# Patient Record
Sex: MALE | Race: ASIAN | NOT HISPANIC OR LATINO | Employment: OTHER | URBAN - METROPOLITAN AREA
[De-identification: names, ages, dates, MRNs, and addresses within clinical notes are randomized per-mention and may not be internally consistent; named-entity substitution may affect disease eponyms.]

---

## 2017-01-13 ENCOUNTER — APPOINTMENT (OUTPATIENT)
Dept: ULTRASOUND IMAGING | Facility: CLINIC | Age: 37
End: 2017-01-13
Attending: FAMILY MEDICINE
Payer: COMMERCIAL

## 2017-01-13 ENCOUNTER — HOSPITAL ENCOUNTER (EMERGENCY)
Facility: CLINIC | Age: 37
Discharge: HOME | End: 2017-01-13
Attending: FAMILY MEDICINE | Admitting: FAMILY MEDICINE
Payer: COMMERCIAL

## 2017-01-13 VITALS
HEART RATE: 88 BPM | TEMPERATURE: 98.2 F | RESPIRATION RATE: 20 BRPM | OXYGEN SATURATION: 100 % | DIASTOLIC BLOOD PRESSURE: 84 MMHG | SYSTOLIC BLOOD PRESSURE: 132 MMHG

## 2017-01-13 DIAGNOSIS — R10.11 RUQ ABDOMINAL PAIN: ICD-10-CM

## 2017-01-13 LAB
ALBUMIN SERPL-MCNC: 4.1 G/DL (ref 3.4–5)
ALBUMIN UR-MCNC: NEGATIVE MG/DL
ALP SERPL-CCNC: 80 U/L (ref 40–150)
ALT SERPL W P-5'-P-CCNC: 21 U/L (ref 0–70)
ANION GAP SERPL CALCULATED.3IONS-SCNC: 7 MMOL/L (ref 3–14)
APPEARANCE UR: CLEAR
AST SERPL W P-5'-P-CCNC: 15 U/L (ref 0–45)
BASOPHILS # BLD AUTO: 0.1 10E9/L (ref 0–0.2)
BASOPHILS NFR BLD AUTO: 0.5 %
BILIRUB SERPL-MCNC: 0.2 MG/DL (ref 0.2–1.3)
BILIRUB UR QL STRIP: NEGATIVE
BUN SERPL-MCNC: 15 MG/DL (ref 7–30)
CALCIUM SERPL-MCNC: 8.8 MG/DL (ref 8.5–10.1)
CHLORIDE SERPL-SCNC: 107 MMOL/L (ref 94–109)
CO2 SERPL-SCNC: 28 MMOL/L (ref 20–32)
COLOR UR AUTO: NORMAL
CREAT SERPL-MCNC: 0.96 MG/DL (ref 0.66–1.25)
DIFFERENTIAL METHOD BLD: NORMAL
EOSINOPHIL # BLD AUTO: 0.3 10E9/L (ref 0–0.7)
EOSINOPHIL NFR BLD AUTO: 3.3 %
ERYTHROCYTE [DISTWIDTH] IN BLOOD BY AUTOMATED COUNT: 13.6 % (ref 10–15)
GFR SERPL CREATININE-BSD FRML MDRD: 90 ML/MIN/1.7M2
GLUCOSE SERPL-MCNC: 90 MG/DL (ref 70–99)
GLUCOSE UR STRIP-MCNC: NEGATIVE MG/DL
HBA1C MFR BLD: 6.3 % (ref 4.3–6)
HCT VFR BLD AUTO: 45 % (ref 40–53)
HGB BLD-MCNC: 14.9 G/DL (ref 13.3–17.7)
HGB UR QL STRIP: NEGATIVE
IMM GRANULOCYTES # BLD: 0 10E9/L (ref 0–0.4)
IMM GRANULOCYTES NFR BLD: 0.3 %
KETONES UR STRIP-MCNC: NEGATIVE MG/DL
LEUKOCYTE ESTERASE UR QL STRIP: NEGATIVE
LIPASE SERPL-CCNC: 322 U/L (ref 73–393)
LYMPHOCYTES # BLD AUTO: 2.9 10E9/L (ref 0.8–5.3)
LYMPHOCYTES NFR BLD AUTO: 28.4 %
MCH RBC QN AUTO: 28.8 PG (ref 26.5–33)
MCHC RBC AUTO-ENTMCNC: 33.1 G/DL (ref 31.5–36.5)
MCV RBC AUTO: 87 FL (ref 78–100)
MONOCYTES # BLD AUTO: 0.9 10E9/L (ref 0–1.3)
MONOCYTES NFR BLD AUTO: 8.4 %
NEUTROPHILS # BLD AUTO: 6 10E9/L (ref 1.6–8.3)
NEUTROPHILS NFR BLD AUTO: 59.1 %
NITRATE UR QL: NEGATIVE
PH UR STRIP: 6 PH (ref 5–7)
PLATELET # BLD AUTO: 228 10E9/L (ref 150–450)
POTASSIUM SERPL-SCNC: 3.7 MMOL/L (ref 3.4–5.3)
PROT SERPL-MCNC: 7.4 G/DL (ref 6.8–8.8)
RBC # BLD AUTO: 5.18 10E12/L (ref 4.4–5.9)
RBC #/AREA URNS AUTO: <1 /HPF (ref 0–2)
SODIUM SERPL-SCNC: 142 MMOL/L (ref 133–144)
SP GR UR STRIP: 1.01 (ref 1–1.03)
TROPONIN I SERPL-MCNC: NORMAL UG/L (ref 0–0.04)
URN SPEC COLLECT METH UR: NORMAL
UROBILINOGEN UR STRIP-MCNC: NORMAL MG/DL (ref 0–2)
WBC # BLD AUTO: 10.1 10E9/L (ref 4–11)
WBC #/AREA URNS AUTO: 1 /HPF (ref 0–2)

## 2017-01-13 PROCEDURE — 81001 URINALYSIS AUTO W/SCOPE: CPT | Performed by: EMERGENCY MEDICINE

## 2017-01-13 PROCEDURE — 25000125 ZZHC RX 250: Performed by: FAMILY MEDICINE

## 2017-01-13 PROCEDURE — 96374 THER/PROPH/DIAG INJ IV PUSH: CPT

## 2017-01-13 PROCEDURE — 85025 COMPLETE CBC W/AUTO DIFF WBC: CPT | Performed by: FAMILY MEDICINE

## 2017-01-13 PROCEDURE — 84484 ASSAY OF TROPONIN QUANT: CPT | Performed by: FAMILY MEDICINE

## 2017-01-13 PROCEDURE — 76705 ECHO EXAM OF ABDOMEN: CPT

## 2017-01-13 PROCEDURE — 80053 COMPREHEN METABOLIC PANEL: CPT | Performed by: FAMILY MEDICINE

## 2017-01-13 PROCEDURE — 83036 HEMOGLOBIN GLYCOSYLATED A1C: CPT | Performed by: FAMILY MEDICINE

## 2017-01-13 PROCEDURE — 93010 ELECTROCARDIOGRAM REPORT: CPT | Performed by: FAMILY MEDICINE

## 2017-01-13 PROCEDURE — 99285 EMERGENCY DEPT VISIT HI MDM: CPT | Mod: 25

## 2017-01-13 PROCEDURE — 93005 ELECTROCARDIOGRAM TRACING: CPT

## 2017-01-13 PROCEDURE — 99284 EMERGENCY DEPT VISIT MOD MDM: CPT | Mod: 25 | Performed by: FAMILY MEDICINE

## 2017-01-13 PROCEDURE — 83690 ASSAY OF LIPASE: CPT | Performed by: FAMILY MEDICINE

## 2017-01-13 RX ORDER — KETOROLAC TROMETHAMINE 30 MG/ML
30 INJECTION, SOLUTION INTRAMUSCULAR; INTRAVENOUS ONCE
Status: COMPLETED | OUTPATIENT
Start: 2017-01-13 | End: 2017-01-13

## 2017-01-13 RX ADMIN — KETOROLAC TROMETHAMINE 30 MG: 30 INJECTION, SOLUTION INTRAMUSCULAR at 19:04

## 2017-01-13 ASSESSMENT — ENCOUNTER SYMPTOMS
CONSTITUTIONAL NEGATIVE: 1
CARDIOVASCULAR NEGATIVE: 1
MUSCULOSKELETAL NEGATIVE: 1
PSYCHIATRIC NEGATIVE: 1
ENDOCRINE NEGATIVE: 1
ALLERGIC/IMMUNOLOGIC NEGATIVE: 1
ABDOMINAL PAIN: 1
NEUROLOGICAL NEGATIVE: 1
HEMATOLOGIC/LYMPHATIC NEGATIVE: 1
EYES NEGATIVE: 1
RESPIRATORY NEGATIVE: 1

## 2017-01-13 NOTE — ED AVS SNAPSHOT
[facility]    [address] [address]    Phone:  [phone]    Fax:  [phone]                                       Zhao De Leon   MRN: [id number]    Department:  [facility]   Date of Visit:  1/13/2017           Patient Information     Date Of Birth          1980        Your diagnoses for this visit were:     RUQ abdominal pain Etiology unclear       You were seen by Stuart Quiñonez MD.      Follow-up Information     Schedule an appointment as soon as possible for a visit with Elvie Cheng MD.    Specialty:  Surgery    Contact information:    [facility]  [address] [address]  [phone]          Discharge Instructions         Abdominal Pain  Abdominal pain is pain in the stomach or intestinal area. Everyone has this pain from time to time. In many cases it goes away on its own. But abdominal pain can sometimes be due to a serious problem, such as appendicitis. So it s important to know when to seek help.  Causes of abdominal pain  There are many possible causes of abdominal pain. Common causes in adults include:    Constipation, diarrhea, or gas    GERD (gastroesophageal reflux disease) movement of stomach acid into the esophagus, also known as acid reflux or heartburn    Peptic ulcer (a sore in the lining of the stomach or small intestine)    Inflammation of the gallbladder, liver, or pancreas    Gallstones or kidney stones    Appendicitis     Obstruction of the intestines     Hernia (bulging of an internal organ through a muscle or other tissue)    Urinary tract infections    In women, menstrual cramps, fibroids, or endometriosis of the uterus    Inflammation or infection of the intestines  Diagnosing the cause of abdominal pain  Your health care provider will examine you to help find the cause of your pain. If needed, tests will be ordered. Because abdominal pain  has so many possible causes, it can be hard to discover the reason for the pain. Giving details about your pain can help. Be ready to tell your health care provider where and when you feel the pain and what makes it better or worse. Also mention whether you have other symptoms such as fever, tiredness, nausea, vomiting, or changes in bathroom habits.  Treating abdominal pain  Certain causes of pain, such as appendicitis or a bowel obstruction, need emergency treatment. Other problems can be treated with rest, fluids, or medications. Your health care provider can give you specific instructions for treatment or self-care based on the cause of your pain.  If you have vomiting or diarrhea, sip water or other clear fluids. When you are ready to eat solid foods again, start with small amounts of easy-to-digest, low-fat foods, such as applesauce, toast, or crackers.   When to call the doctor  Call 911 or go to the hospital right away if you:    Can t pass stool and are vomiting    Are vomiting blood or have black, tarry diarrhea    Also have chest, neck, or shoulder pain    Feel like you are about to pass out    Have pain in your shoulder blades with nausea    Have sudden, excruciating abdominal pain    Have new, severe pain unlike any you have felt before    Have a belly that is rigid, hard, and tender to touch  Call your doctor if you have:    Pain for more than 5 days    Bloating for more than 2 days    Diarrhea for more than 5 days    Fever of 101 F (38.3 C) or higher    Pain that continues to worsen    Unexplained weight loss    Continued lack of appetite    Blood in the stool  How to prevent abdominal pain  Here are some tips to help prevent abdominal pain:    Eat smaller amounts of food at one time.    Avoid greasy, fried, or other high-fat foods.    Avoid foods that give you gas.    Exercise regularly.    Drink plenty of fluids.  To help prevent symptoms of gastroesophageal reflux disease (GERD):    Quit  smoking.    Reduce alcohol and certain foods that increase stomach acid.     Lose excess weight.    Finish eating at least 2 hours before you go to bed or lie down.    Elevate the head of your bed.    9541-0629 [employer]. [address] [place] [address], [place] [address]. All rights reserved. This information is not intended as a substitute for professional medical care. Always follow your healthcare professional's instructions.          24 Hour Appointment Hotline       To make an appointment at any [facility], call [phone] ([phone]). If you don't have a family doctor or clinic, we will help you find one. [facility] are conveniently located to serve the needs of you and your family.             Review of your medicines      Our records show that you are taking the medicines listed below. If these are incorrect, please call your family doctor or clinic.        Dose / Directions Last dose taken    ALEVE PO   Dose:  440 mg        Take 440 mg by mouth 2 times daily as needed for moderate pain   Refills:  0        ibuprofen 800 MG tablet   Commonly known as:  ADVIL/MOTRIN   Dose:  800 mg   Quantity:  30 tablet        Take 1 tablet (800 mg) by mouth every 6 hours as needed for moderate pain   Refills:  0                Procedures and tests performed during your visit     CBC with platelets differential    Comprehensive metabolic panel    EKG 12-lead, tracing only    Hemaglobin A1C    Lipase    Troponin I    UA with Microscopic reflex to Culture    US Abdomen Limited      Orders Needing Specimen Collection     Ordered          01/13/17 1852  UA reflex to Microscopic and Culture - STAT, [facility]: STAT, Needs to be Collected     Scheduled Task Status   01/13/17 1851 Collect UA reflex to Microscopic and Culture Open   Order Class:  PCU Collect                  Pending Results     Date and Time Order Name Status Description    1/13/2017 1852 US Abdomen Limited Preliminary             Pending  Culture Results     No orders found from 1/12/2017 to 1/14/2017.       Test Results from your hospital stay           1/13/2017  6:55 PM - Interface, Flexilab Results      Component Results     Component Value Ref Range & Units Status    Color Urine Light Yellow  Final    Appearance Urine Clear  Final    Glucose Urine Negative NEG mg/dL Final    Bilirubin Urine Negative NEG Final    Ketones Urine Negative NEG mg/dL Final    Specific Gravity Urine 1.015 1.003 - 1.035 Final    Blood Urine Negative NEG Final    pH Urine 6.0 5.0 - 7.0 pH Final    Protein Albumin Urine Negative NEG mg/dL Final    Urobilinogen mg/dL Normal 0.0 - 2.0 mg/dL Final    Nitrite Urine Negative NEG Final    Leukocyte Esterase Urine Negative NEG Final    Source Midstream Urine  Final    WBC Urine 1 0 - 2 /HPF Final    RBC Urine <1 0 - 2 /HPF Final         1/13/2017  7:29 PM - Interface, Flexilab Results      Component Results     Component Value Ref Range & Units Status    WBC 10.1 4.0 - 11.0 10e9/L Final    RBC Count 5.18 4.4 - 5.9 10e12/L Final    Hemoglobin 14.9 13.3 - 17.7 g/dL Final    Hematocrit 45.0 40.0 - 53.0 % Final    MCV 87 78 - 100 fl Final    MCH 28.8 26.5 - 33.0 pg Final    MCHC 33.1 31.5 - 36.5 g/dL Final    RDW 13.6 10.0 - 15.0 % Final    Platelet Count 228 150 - 450 10e9/L Final    Diff Method Automated Method  Final    % Neutrophils 59.1 % Final    % Lymphocytes 28.4 % Final    % Monocytes 8.4 % Final    % Eosinophils 3.3 % Final    % Basophils 0.5 % Final    % Immature Granulocytes 0.3 % Final    Absolute Neutrophil 6.0 1.6 - 8.3 10e9/L Final    Absolute Lymphocytes 2.9 0.8 - 5.3 10e9/L Final    Absolute Monocytes 0.9 0.0 - 1.3 10e9/L Final    Absolute Eosinophils 0.3 0.0 - 0.7 10e9/L Final    Absolute Basophils 0.1 0.0 - 0.2 10e9/L Final    Abs Immature Granulocytes 0.0 0 - 0.4 10e9/L Final         1/13/2017  7:48 PM - Interface, Flexilab Results      Component Results     Component Value Ref Range & Units Status    Sodium 142  133 - 144 mmol/L Final    Potassium 3.7 3.4 - 5.3 mmol/L Final    Chloride 107 94 - 109 mmol/L Final    Carbon Dioxide 28 20 - 32 mmol/L Final    Anion Gap 7 3 - 14 mmol/L Final    Glucose 90 70 - 99 mg/dL Final    Urea Nitrogen 15 7 - 30 mg/dL Final    Creatinine 0.96 0.66 - 1.25 mg/dL Final    GFR Estimate 90 >60 mL/min/1.7m2 Final    Non  GFR Calc    GFR Estimate If Black >90   GFR Calc   >60 mL/min/1.7m2 Final    Calcium 8.8 8.5 - 10.1 mg/dL Final    Bilirubin Total 0.2 0.2 - 1.3 mg/dL Final    Albumin 4.1 3.4 - 5.0 g/dL Final    Protein Total 7.4 6.8 - 8.8 g/dL Final    Alkaline Phosphatase 80 40 - 150 U/L Final    ALT 21 0 - 70 U/L Final    AST 15 0 - 45 U/L Final         1/13/2017  7:44 PM - Interface, Flexilab Results      Component Results     Component Value Ref Range & Units Status    Lipase 322 73 - 393 U/L Final         1/13/2017  7:44 PM - Interface, Flexilab Results      Component Results     Component Value Ref Range & Units Status    Troponin I ES  0.000 - 0.045 ug/L Final    <0.015  The 99th percentile for upper reference range is 0.045 ug/L.  Troponin values in   the range of 0.045 - 0.120 ug/L may be associated with risks of adverse   clinical events.           1/13/2017  8:07 PM - Interface, Radiant Ib      Narrative     RIGHT UPPER QUADRANT ULTRASOUND 1/13/2017 7:44 PM    HISTORY:  Right upper quadrant abdominal pain.    COMPARISON: None.    FINDINGS:    Gallbladder: Gallbladder is contracted with multiple small polypoid  structures within it that may indicate adenomyosis. No stones are  seen. No focal tenderness.    Bile ducts:   CHD is normal diameter.  No intrahepatic biliary  dilatation.    Liver:   Normal.     Pancreas:  Neck and proximal body appear normal. Head and tail are  obscured by gas.    Right kidney:   Normal.         Impression     IMPRESSION:    1. Contracted gallbladder with probable adenomyosis.  2. Pancreas mostly obscured by gas.             1/13/2017  8:04 PM - Interface, [employer] Results      Component Results     Component Value Ref Range & Units Status    Hemoglobin A1C 6.3 (H) 4.3 - 6.0 % Final                Thank you for choosing [facility]       Thank you for choosing [facility] for your care. Our goal is always to provide you with excellent care. Hearing back from our patients is one way we can continue to improve our services. Please take a few minutes to complete the written survey that you may receive in the mail after you visit with us. Thank you!        [employer] Information     [employer] gives you secure access to your electronic health record. If you see a primary care provider, you can also send messages to your care team and make appointments. If you have questions, please call your primary care clinic.  If you do not have a primary care provider, please call [phone] and they will assist you.        Care EveryWhere ID     This is your Care EveryWhere ID. This could be used by other organizations to access your [facility] medical records  [id number]        After Visit Summary       This is your record. Keep this with you and show to your community pharmacist(s) and doctor(s) at your next visit.

## 2017-01-13 NOTE — ED AVS SNAPSHOT
[facility]    [address] [address]    Phone:  [phone]    Fax:  [phone]                                       Zhao De Leon   MRN: [id number]    Department:  [facility]   Date of Visit:  1/13/2017           After Visit Summary Signature Page     I have received my discharge instructions, and my questions have been answered. I have discussed any challenges I see with this plan with the nurse or doctor.    ..........................................................................................................................................  Patient/Patient Representative Signature      ..........................................................................................................................................  Patient Representative Print Name and Relationship to Patient    ..................................................               ................................................  Date                                            Time    ..........................................................................................................................................  Reviewed by Signature/Title    ...................................................              ..............................................  Date                                                            Time

## 2017-01-14 NOTE — DISCHARGE INSTRUCTIONS
Abdominal Pain  Abdominal pain is pain in the stomach or intestinal area. Everyone has this pain from time to time. In many cases it goes away on its own. But abdominal pain can sometimes be due to a serious problem, such as appendicitis. So it s important to know when to seek help.  Causes of abdominal pain  There are many possible causes of abdominal pain. Common causes in adults include:    Constipation, diarrhea, or gas    GERD (gastroesophageal reflux disease) movement of stomach acid into the esophagus, also known as acid reflux or heartburn    Peptic ulcer (a sore in the lining of the stomach or small intestine)    Inflammation of the gallbladder, liver, or pancreas    Gallstones or kidney stones    Appendicitis     Obstruction of the intestines     Hernia (bulging of an internal organ through a muscle or other tissue)    Urinary tract infections    In women, menstrual cramps, fibroids, or endometriosis of the uterus    Inflammation or infection of the intestines  Diagnosing the cause of abdominal pain  Your health care provider will examine you to help find the cause of your pain. If needed, tests will be ordered. Because abdominal pain has so many possible causes, it can be hard to discover the reason for the pain. Giving details about your pain can help. Be ready to tell your health care provider where and when you feel the pain and what makes it better or worse. Also mention whether you have other symptoms such as fever, tiredness, nausea, vomiting, or changes in bathroom habits.  Treating abdominal pain  Certain causes of pain, such as appendicitis or a bowel obstruction, need emergency treatment. Other problems can be treated with rest, fluids, or medications. Your health care provider can give you specific instructions for treatment or self-care based on the cause of your pain.  If you have vomiting or diarrhea, sip water or other clear fluids. When you are ready to eat solid foods again, start with  small amounts of easy-to-digest, low-fat foods, such as applesauce, toast, or crackers.   When to call the doctor  Call 911 or go to the hospital right away if you:    Can t pass stool and are vomiting    Are vomiting blood or have black, tarry diarrhea    Also have chest, neck, or shoulder pain    Feel like you are about to pass out    Have pain in your shoulder blades with nausea    Have sudden, excruciating abdominal pain    Have new, severe pain unlike any you have felt before    Have a belly that is rigid, hard, and tender to touch  Call your doctor if you have:    Pain for more than 5 days    Bloating for more than 2 days    Diarrhea for more than 5 days    Fever of 101 F (38.3 C) or higher    Pain that continues to worsen    Unexplained weight loss    Continued lack of appetite    Blood in the stool  How to prevent abdominal pain  Here are some tips to help prevent abdominal pain:    Eat smaller amounts of food at one time.    Avoid greasy, fried, or other high-fat foods.    Avoid foods that give you gas.    Exercise regularly.    Drink plenty of fluids.  To help prevent symptoms of gastroesophageal reflux disease (GERD):    Quit smoking.    Reduce alcohol and certain foods that increase stomach acid.     Lose excess weight.    Finish eating at least 2 hours before you go to bed or lie down.    Elevate the head of your bed.    7697-2565 [employer]. [address] [place] [address], [place] [address]. All rights reserved. This information is not intended as a substitute for professional medical care. Always follow your healthcare professional's instructions.

## 2017-01-16 ENCOUNTER — OFFICE VISIT (OUTPATIENT)
Dept: SURGERY | Facility: CLINIC | Age: 37
End: 2017-01-16
Payer: COMMERCIAL

## 2017-01-16 VITALS
DIASTOLIC BLOOD PRESSURE: 77 MMHG | HEIGHT: 71 IN | HEART RATE: 102 BPM | SYSTOLIC BLOOD PRESSURE: 135 MMHG | BODY MASS INDEX: 26.04 KG/M2 | TEMPERATURE: 98.3 F | WEIGHT: 186 LBS

## 2017-01-16 DIAGNOSIS — R10.11 RIGHT UPPER QUADRANT PAIN: Primary | ICD-10-CM

## 2017-01-16 DIAGNOSIS — R10.11 RIGHT UPPER QUADRANT ABDOMINAL PAIN: ICD-10-CM

## 2017-01-16 PROCEDURE — 99204 OFFICE O/P NEW MOD 45 MIN: CPT | Performed by: SURGERY

## 2017-01-16 ASSESSMENT — PAIN SCALES - GENERAL: PAINLEVEL: MILD PAIN (3)

## 2017-01-16 NOTE — PROGRESS NOTES
PCP:  No primary care provider on file.    Chief complaint: Right upper quadrant pain, abnormal ultrasound    History of Present Illness: This 36-year-old healthy man presents to the surgical clinic in follow-up from an ER visit last Friday (3 days ago). He presented to the emergency room with complaints of right-sided abdominal pain which had begun about 3-4 days prior. He has some anxiety issues which she readily admits. This caused him to be concerned about the pain. He was wondering if he had gallstones or maybe even appendicitis. The pain since Friday has improved, but is still present.    In the emergency room he had a fairly thorough evaluation. He had labs which were all negative. He had an ultrasound of his gallbladder which showed a contracted gallbladder. He had not been fasting for this procedure. He also had the finding of a lot of polypoid lesions lining the mucosal side of the gallbladder wall. These were not seen on a CT scan performed in 2010, which was done for kidney stones. No other symptoms to speak of no nausea vomiting fevers change in bowel habits etc.    He is not interested in any surgical treatment, but was primarily concerned about what these polyps meant to his future health.    He has a history of peptic ulcer disease diagnosed about 10 years ago. This pain is different than that.    Histories:  Past Medical History   Diagnosis Date     Peptic ulcer, unspecified site, unspecified as acute or chronic, without mention of hemorrhage, perforation, or obstruction      Lumbago      Lumbar disc disease      with herniation     Ulcer (H) 2000     History of duodenal ulcer       Past Surgical History   Procedure Laterality Date     Surgical history of -        Lumbar Disc Herniation     Colonoscopy  3/6/2013     Procedure: COLONOSCOPY;  Colonoscopy  ;  Surgeon: Aashish Sierra MD;  Location: [facility]       Family History   Problem Relation Age of Onset     GASTROINTESTINAL DISEASE Father       hepatitis B     Blood Disease Father      hepatitis B     Cancer - colorectal Maternal Grandmother      Cancer - colorectal Paternal Grandmother      Coronary Artery Disease Maternal Grandfather 72     stroke       Social History   Substance Use Topics     Smoking status: Current Every Day Smoker -- 0.50 packs/day     Types: Other     Smokeless tobacco: Never Used      Comment:  1/2 to 1 ppd- he is using the vapor.      Alcohol Use: Yes      Comment: occ- very rare, once per year.       Current Outpatient Prescriptions   Medication Sig Dispense Refill     Naproxen Sodium (ALEVE PO) Take 440 mg by mouth 2 times daily as needed for moderate pain       ibuprofen (ADVIL,MOTRIN) 800 MG tablet Take 1 tablet (800 mg) by mouth every 6 hours as needed for moderate pain 30 tablet 0       Allergies   Allergen Reactions     Demerol Hives       Images:  Recent Results (from the past 744 hour(s))   US Abdomen Limited    Narrative    RIGHT UPPER QUADRANT ULTRASOUND 1/13/2017 7:44 PM    HISTORY:  Right upper quadrant abdominal pain.    COMPARISON: None.    FINDINGS:    Gallbladder: Gallbladder is contracted with multiple small polypoid  structures within it that may indicate adenomyosis. No stones are  seen. No focal tenderness.    Bile ducts:   CHD is normal diameter.  No intrahepatic biliary  dilatation.    Liver:   Normal.     Pancreas:  Neck and proximal body appear normal. Head and tail are  obscured by gas.    Right kidney:   Normal.       Impression    IMPRESSION:    1. Contracted gallbladder with probable adenomyosis.  2. Pancreas mostly obscured by gas.     LAURYN BROTHERS MD       Labs:  Results for orders placed or performed during the hospital encounter of 01/13/17   US Abdomen Limited    Narrative    RIGHT UPPER QUADRANT ULTRASOUND 1/13/2017 7:44 PM    HISTORY:  Right upper quadrant abdominal pain.    COMPARISON: None.    FINDINGS:    Gallbladder: Gallbladder is contracted with multiple small polypoid  structures  within it that may indicate adenomyosis. No stones are  seen. No focal tenderness.    Bile ducts:   CHD is normal diameter.  No intrahepatic biliary  dilatation.    Liver:   Normal.     Pancreas:  Neck and proximal body appear normal. Head and tail are  obscured by gas.    Right kidney:   Normal.       Impression    IMPRESSION:    1. Contracted gallbladder with probable adenomyosis.  2. Pancreas mostly obscured by gas.     LAURYN BROTHERS MD   UA with Microscopic reflex to Culture   Result Value Ref Range    Color Urine Light Yellow     Appearance Urine Clear     Glucose Urine Negative NEG mg/dL    Bilirubin Urine Negative NEG    Ketones Urine Negative NEG mg/dL    Specific Gravity Urine 1.015 1.003 - 1.035    Blood Urine Negative NEG    pH Urine 6.0 5.0 - 7.0 pH    Protein Albumin Urine Negative NEG mg/dL    Urobilinogen mg/dL Normal 0.0 - 2.0 mg/dL    Nitrite Urine Negative NEG    Leukocyte Esterase Urine Negative NEG    Source Midstream Urine     WBC Urine 1 0 - 2 /HPF    RBC Urine <1 0 - 2 /HPF   CBC with platelets differential   Result Value Ref Range    WBC 10.1 4.0 - 11.0 10e9/L    RBC Count 5.18 4.4 - 5.9 10e12/L    Hemoglobin 14.9 13.3 - 17.7 g/dL    Hematocrit 45.0 40.0 - 53.0 %    MCV 87 78 - 100 fl    MCH 28.8 26.5 - 33.0 pg    MCHC 33.1 31.5 - 36.5 g/dL    RDW 13.6 10.0 - 15.0 %    Platelet Count 228 150 - 450 10e9/L    Diff Method Automated Method     % Neutrophils 59.1 %    % Lymphocytes 28.4 %    % Monocytes 8.4 %    % Eosinophils 3.3 %    % Basophils 0.5 %    % Immature Granulocytes 0.3 %    Absolute Neutrophil 6.0 1.6 - 8.3 10e9/L    Absolute Lymphocytes 2.9 0.8 - 5.3 10e9/L    Absolute Monocytes 0.9 0.0 - 1.3 10e9/L    Absolute Eosinophils 0.3 0.0 - 0.7 10e9/L    Absolute Basophils 0.1 0.0 - 0.2 10e9/L    Abs Immature Granulocytes 0.0 0 - 0.4 10e9/L   Comprehensive metabolic panel   Result Value Ref Range    Sodium 142 133 - 144 mmol/L    Potassium 3.7 3.4 - 5.3 mmol/L    Chloride 107 94 - 109  "mmol/L    Carbon Dioxide 28 20 - 32 mmol/L    Anion Gap 7 3 - 14 mmol/L    Glucose 90 70 - 99 mg/dL    Urea Nitrogen 15 7 - 30 mg/dL    Creatinine 0.96 0.66 - 1.25 mg/dL    GFR Estimate 90 >60 mL/min/1.7m2    GFR Estimate If Black >90   GFR Calc   >60 mL/min/1.7m2    Calcium 8.8 8.5 - 10.1 mg/dL    Bilirubin Total 0.2 0.2 - 1.3 mg/dL    Albumin 4.1 3.4 - 5.0 g/dL    Protein Total 7.4 6.8 - 8.8 g/dL    Alkaline Phosphatase 80 40 - 150 U/L    ALT 21 0 - 70 U/L    AST 15 0 - 45 U/L   Lipase   Result Value Ref Range    Lipase 322 73 - 393 U/L   Troponin I   Result Value Ref Range    Troponin I ES  0.000 - 0.045 ug/L     <0.015  The 99th percentile for upper reference range is 0.045 ug/L.  Troponin values in   the range of 0.045 - 0.120 ug/L may be associated with risks of adverse   clinical events.     Hemaglobin A1C   Result Value Ref Range    Hemoglobin A1C 6.3 (H) 4.3 - 6.0 %       ROS:  Constitutional - Denies fevers, weight loss, malaise, lethargy  Neuro - Denies tremors or seizures  Pulmon - Denies SOB, dyspnea, hemoptysis, chronic cough or use of an inhaler  CV - Denies CP, SOB, lower extremity edema, difficulty w/ stairs, has never used NTG  GI - Denies hematemesis, BRBPR, melena, chronic diarrhea   - Denies hematuria, difficulty voiding, h/o STDs  Hematology - Denies blood clotting disorders, chronic anemias  Dermatology - No melanomas or skin cancers  Rheumatology - No h/o RA  Pysch - Denies depression, bipolar d/o or schizophrenia    /77 mmHg  Pulse 102  Temp(Src) 98.3  F (36.8  C) (Oral)  Ht 1.791 m (5' 10.5\")  Wt 84.369 kg (186 lb)  BMI 26.30 kg/m2    Exam:  General - Alert and Oriented X4, NAD, well nourished  HEENT - Normocephalic, atraumatic, PERRLA, Nose midline,   Neck - supple, no LAD,   Lungs -respirations unlabored  CV - Heart RRR,  Abdomen - Soft, non-tender, +BS, no hepatosplenomegaly, no palpable masses  Rectal -deferred  Neuro - Full ROM, Strength 5/5 and major " muscle groups, sensation intact  Extremities - No cyanosis, clubbing or edema.      Assessment and Plan: Abdominal pain which is resolving. I really don't have a reason for his pain. Regarding his abnormal ultrasound, it sounds like he has adenomyosis. I reviewed this diagnosis with him. It is non-malignant polyposis of the gallbladder. It does not put him at risk for cancer. Polyps of the gallbladder are only dealt with if they are greater than 1 cm in diameter. None of his approach that dimension.    I don't think he requires any surgical treatment, and as mentioned he is not interested in any. He would like to have this followed, and I think is reasonable. Therefore I recommended an ultrasound and 70 months. He will come to this exam fasting, which provided better view of the gallbladder wall. I'd be happy to see him after that to go over the results he is happy with that plan. We'll proceed as outlined above        Mando Bravo MD

## 2017-01-16 NOTE — NURSING NOTE
"Initial /77 mmHg  Pulse 102  Temp(Src) 98.3  F (36.8  C) (Oral)  Ht 1.791 m (5' 10.5\")  Wt 84.369 kg (186 lb)  BMI 26.30 kg/m2 Estimated body mass index is 26.3 kg/(m^2) as calculated from the following:    Height as of this encounter: 1.791 m (5' 10.5\").    Weight as of this encounter: 84.369 kg (186 lb). .    Clair Crawford CMA    "

## 2018-07-24 ENCOUNTER — APPOINTMENT (OUTPATIENT)
Dept: ULTRASOUND IMAGING | Facility: CLINIC | Age: 38
End: 2018-07-24
Attending: EMERGENCY MEDICINE
Payer: COMMERCIAL

## 2018-07-24 ENCOUNTER — HOSPITAL ENCOUNTER (EMERGENCY)
Facility: CLINIC | Age: 38
Discharge: HOME | End: 2018-07-24
Attending: EMERGENCY MEDICINE | Admitting: EMERGENCY MEDICINE
Payer: COMMERCIAL

## 2018-07-24 VITALS
SYSTOLIC BLOOD PRESSURE: 126 MMHG | RESPIRATION RATE: 18 BRPM | HEART RATE: 103 BPM | TEMPERATURE: 99 F | BODY MASS INDEX: 28.15 KG/M2 | WEIGHT: 199 LBS | DIASTOLIC BLOOD PRESSURE: 75 MMHG | OXYGEN SATURATION: 97 %

## 2018-07-24 DIAGNOSIS — R10.11 ABDOMINAL PAIN, RIGHT UPPER QUADRANT: ICD-10-CM

## 2018-07-24 DIAGNOSIS — K82.4 GALLBLADDER POLYP: ICD-10-CM

## 2018-07-24 LAB
ALBUMIN SERPL-MCNC: 4 G/DL (ref 3.4–5)
ALBUMIN UR-MCNC: NEGATIVE MG/DL
ALP SERPL-CCNC: 97 U/L (ref 40–150)
ALT SERPL W P-5'-P-CCNC: 27 U/L (ref 0–70)
ANION GAP SERPL CALCULATED.3IONS-SCNC: 5 MMOL/L (ref 3–14)
APPEARANCE UR: CLEAR
AST SERPL W P-5'-P-CCNC: 14 U/L (ref 0–45)
BASOPHILS # BLD AUTO: 0 10E9/L (ref 0–0.2)
BASOPHILS NFR BLD AUTO: 0.5 %
BILIRUB SERPL-MCNC: 0.4 MG/DL (ref 0.2–1.3)
BILIRUB UR QL STRIP: NEGATIVE
BUN SERPL-MCNC: 15 MG/DL (ref 7–30)
CALCIUM SERPL-MCNC: 8.7 MG/DL (ref 8.5–10.1)
CHLORIDE SERPL-SCNC: 105 MMOL/L (ref 94–109)
CO2 SERPL-SCNC: 28 MMOL/L (ref 20–32)
COLOR UR AUTO: ABNORMAL
CREAT SERPL-MCNC: 0.94 MG/DL (ref 0.66–1.25)
DIFFERENTIAL METHOD BLD: NORMAL
EOSINOPHIL # BLD AUTO: 0.1 10E9/L (ref 0–0.7)
EOSINOPHIL NFR BLD AUTO: 1.7 %
ERYTHROCYTE [DISTWIDTH] IN BLOOD BY AUTOMATED COUNT: 13.1 % (ref 10–15)
GFR SERPL CREATININE-BSD FRML MDRD: >90 ML/MIN/1.7M2
GLUCOSE SERPL-MCNC: 175 MG/DL (ref 70–99)
GLUCOSE UR STRIP-MCNC: NEGATIVE MG/DL
HBA1C MFR BLD: 6.3 % (ref 0–5.6)
HCT VFR BLD AUTO: 46.5 % (ref 40–53)
HGB BLD-MCNC: 15.5 G/DL (ref 13.3–17.7)
HGB UR QL STRIP: ABNORMAL
IMM GRANULOCYTES # BLD: 0 10E9/L (ref 0–0.4)
IMM GRANULOCYTES NFR BLD: 0.5 %
KETONES UR STRIP-MCNC: NEGATIVE MG/DL
LEUKOCYTE ESTERASE UR QL STRIP: NEGATIVE
LIPASE SERPL-CCNC: 499 U/L (ref 73–393)
LYMPHOCYTES # BLD AUTO: 1.8 10E9/L (ref 0.8–5.3)
LYMPHOCYTES NFR BLD AUTO: 21.4 %
MCH RBC QN AUTO: 29 PG (ref 26.5–33)
MCHC RBC AUTO-ENTMCNC: 33.3 G/DL (ref 31.5–36.5)
MCV RBC AUTO: 87 FL (ref 78–100)
MONOCYTES # BLD AUTO: 0.5 10E9/L (ref 0–1.3)
MONOCYTES NFR BLD AUTO: 5.6 %
MUCOUS THREADS #/AREA URNS LPF: PRESENT /LPF
NEUTROPHILS # BLD AUTO: 5.8 10E9/L (ref 1.6–8.3)
NEUTROPHILS NFR BLD AUTO: 70.3 %
NITRATE UR QL: NEGATIVE
NRBC # BLD AUTO: 0 10*3/UL
NRBC BLD AUTO-RTO: 0 /100
PH UR STRIP: 5 PH (ref 5–7)
PLATELET # BLD AUTO: 223 10E9/L (ref 150–450)
POTASSIUM SERPL-SCNC: 3.8 MMOL/L (ref 3.4–5.3)
PROT SERPL-MCNC: 7.7 G/DL (ref 6.8–8.8)
RBC # BLD AUTO: 5.35 10E12/L (ref 4.4–5.9)
RBC #/AREA URNS AUTO: 0 /HPF (ref 0–2)
SODIUM SERPL-SCNC: 138 MMOL/L (ref 133–144)
SOURCE: ABNORMAL
SP GR UR STRIP: 1 (ref 1–1.03)
UROBILINOGEN UR STRIP-MCNC: 0 MG/DL (ref 0–2)
WBC # BLD AUTO: 8.3 10E9/L (ref 4–11)
WBC #/AREA URNS AUTO: 1 /HPF (ref 0–5)

## 2018-07-24 PROCEDURE — 81001 URINALYSIS AUTO W/SCOPE: CPT | Performed by: EMERGENCY MEDICINE

## 2018-07-24 PROCEDURE — 99284 EMERGENCY DEPT VISIT MOD MDM: CPT | Mod: 25 | Performed by: EMERGENCY MEDICINE

## 2018-07-24 PROCEDURE — 85025 COMPLETE CBC W/AUTO DIFF WBC: CPT | Performed by: EMERGENCY MEDICINE

## 2018-07-24 PROCEDURE — 83690 ASSAY OF LIPASE: CPT | Performed by: EMERGENCY MEDICINE

## 2018-07-24 PROCEDURE — 99284 EMERGENCY DEPT VISIT MOD MDM: CPT | Mod: Z6 | Performed by: EMERGENCY MEDICINE

## 2018-07-24 PROCEDURE — 80053 COMPREHEN METABOLIC PANEL: CPT | Performed by: EMERGENCY MEDICINE

## 2018-07-24 PROCEDURE — 83036 HEMOGLOBIN GLYCOSYLATED A1C: CPT | Performed by: EMERGENCY MEDICINE

## 2018-07-24 PROCEDURE — 76705 ECHO EXAM OF ABDOMEN: CPT

## 2018-07-24 ASSESSMENT — ENCOUNTER SYMPTOMS
DIARRHEA: 0
VOMITING: 0
FEVER: 0
ABDOMINAL PAIN: 1

## 2018-07-24 NOTE — ED AVS SNAPSHOT
[facility]    [address] [address]    Phone:  [phone]    Fax:  [phone]                                       Zhao De Leon   MRN: [id number]    Department:  [facility]   Date of Visit:  7/24/2018           Patient Information     Date Of Birth          1980        Your diagnoses for this visit were:     Gallbladder polyp     Abdominal pain, right upper quadrant        You were seen by Ramy Urbina DO.        Discharge Instructions       Medical workup this evening shows no acute inflammatory or infectious process around the liver gallbladder.  There is no identified gallstones.  You do have gallbladder polyps.  These could be causing occasional obstruction to bile outflow of the gallbladder contracts which might contribute to occasional sharp pain in the right upper abdomen.  If you note frequent occurrence for pain in the right upper quadrant of the abdomen typically within 20 or 30 minutes after eating a meal that contains fat -you should then consider seeing general surgery for consideration of elective removal of the gallbladder(cholecystectomy).  This was done laparoscopically with 4 small half inch incisions in patients go home the same day with 1 week recovery.        24 Hour Appointment Hotline       To make an appointment at any [facility] clinic, call [phone] ([phone]). If you don't have a family doctor or clinic, we will help you find one. [facility] clinics are conveniently located to serve the needs of you and your family.             Review of your medicines      Notice     You have not been prescribed any medications.            Procedures and tests performed during your visit     CBC with platelets differential    Comprehensive metabolic panel    Hemaglobin A1C    Lipase    UA reflex to Microscopic    US Abdomen Limited      Orders Needing Specimen Collection     None      Pending  Results     Date and Time Order Name Status Description    7/24/2018 1814 US Abdomen Limited Preliminary             Pending Culture Results     No orders found from 7/22/2018 to 7/25/2018.            Pending Results Instructions     If you had any lab results that were not finalized at the time of your Discharge, you can call the ED Lab Result RN at [phone]. You will be contacted by this team for any positive Lab results or changes in treatment. The nurses are available 7 days a week from 10A to 6:30P.  You can leave a message 24 hours per day and they will return your call.        Test Results From Your Hospital Stay        7/24/2018  5:09 PM      Component Results     Component Value Ref Range & Units Status    WBC 8.3 4.0 - 11.0 10e9/L Final    RBC Count 5.35 4.4 - 5.9 10e12/L Final    Hemoglobin 15.5 13.3 - 17.7 g/dL Final    Hematocrit 46.5 40.0 - 53.0 % Final    MCV 87 78 - 100 fl Final    MCH 29.0 26.5 - 33.0 pg Final    MCHC 33.3 31.5 - 36.5 g/dL Final    RDW 13.1 10.0 - 15.0 % Final    Platelet Count 223 150 - 450 10e9/L Final    Diff Method Automated Method  Final    % Neutrophils 70.3 % Final    % Lymphocytes 21.4 % Final    % Monocytes 5.6 % Final    % Eosinophils 1.7 % Final    % Basophils 0.5 % Final    % Immature Granulocytes 0.5 % Final    Nucleated RBCs 0 0 /100 Final    Absolute Neutrophil 5.8 1.6 - 8.3 10e9/L Final    Absolute Lymphocytes 1.8 0.8 - 5.3 10e9/L Final    Absolute Monocytes 0.5 0.0 - 1.3 10e9/L Final    Absolute Eosinophils 0.1 0.0 - 0.7 10e9/L Final    Absolute Basophils 0.0 0.0 - 0.2 10e9/L Final    Abs Immature Granulocytes 0.0 0 - 0.4 10e9/L Final    Absolute Nucleated RBC 0.0  Final         7/24/2018  5:23 PM      Component Results     Component Value Ref Range & Units Status    Sodium 138 133 - 144 mmol/L Final    Potassium 3.8 3.4 - 5.3 mmol/L Final    Chloride 105 94 - 109 mmol/L Final    Carbon Dioxide 28 20 - 32 mmol/L Final    Anion Gap 5 3 - 14 mmol/L Final     Glucose 175 (H) 70 - 99 mg/dL Final    Urea Nitrogen 15 7 - 30 mg/dL Final    Creatinine 0.94 0.66 - 1.25 mg/dL Final    GFR Estimate >90 >60 mL/min/1.7m2 Final    Non  GFR Calc    GFR Estimate If Black >90 >60 mL/min/1.7m2 Final    African American GFR Calc    Calcium 8.7 8.5 - 10.1 mg/dL Final    Bilirubin Total 0.4 0.2 - 1.3 mg/dL Final    Albumin 4.0 3.4 - 5.0 g/dL Final    Protein Total 7.7 6.8 - 8.8 g/dL Final    Alkaline Phosphatase 97 40 - 150 U/L Final    ALT 27 0 - 70 U/L Final    AST 14 0 - 45 U/L Final         7/24/2018  5:20 PM      Component Results     Component Value Ref Range & Units Status    Lipase 499 (H) 73 - 393 U/L Final         7/24/2018  5:46 PM      Component Results     Component Value Ref Range & Units Status    Color Urine Straw  Final    Appearance Urine Clear  Final    Glucose Urine Negative NEG^Negative mg/dL Final    Bilirubin Urine Negative NEG^Negative Final    Ketones Urine Negative NEG^Negative mg/dL Final    Specific Gravity Urine 1.005 1.003 - 1.035 Final    Blood Urine Small (A) NEG^Negative Final    pH Urine 5.0 5.0 - 7.0 pH Final    Protein Albumin Urine Negative NEG^Negative mg/dL Final    Urobilinogen mg/dL 0.0 0.0 - 2.0 mg/dL Final    Nitrite Urine Negative NEG^Negative Final    Leukocyte Esterase Urine Negative NEG^Negative Final    Source Midstream Urine  Final    RBC Urine 0 0 - 2 /HPF Final    WBC Urine 1 0 - 5 /HPF Final    Mucous Urine Present (A) NEG^Negative /LPF Final         7/24/2018  6:17 PM      Component Results     Component Value Ref Range & Units Status    Hemoglobin A1C 6.3 (H) 0 - 5.6 % Final    Normal <5.7% Prediabetes 5.7-6.4%  Diabetes 6.5% or higher - adopted from ADA   consensus guidelines.           7/24/2018  7:15 PM      Narrative     US ABDOMEN LIMITED 7/24/2018 7:08 PM    CLINICAL INFORMATION: RUQ pain.     COMPARISON: 1/13/2017.    FINDINGS:  Limited right upper quadrant ultrasound is somewhat limited by bowel  gas. The  gallbladder contains several mildly echogenic apparently  nonmobile nodules along the gallbladder wall which are likely polyps  measuring up to 0.7 cm. These were also seen previously. No shadowing  gallstones, gallbladder wall thickening or sonographic Guallpa's sign.  No bile duct dilatation. Negative liver. Visualized portions of the   pancreas are negative. The visualized portion of the right kidney is  unremarkable. No hydronephrosis on the right. No free fluid in the  right upper quadrant.        Impression     IMPRESSION:  1. No acute sonographic findings in the right upper quadrant.    2. Gallbladder wall polyps. Consider surveillance imaging to document  stability (at approximately 6-12 months) if gallbladder is not  removed.                Thank you for choosing [facility]       Thank you for choosing [facility] for your care. Our goal is always to provide you with excellent care. Hearing back from our patients is one way we can continue to improve our services. Please take a few minutes to complete the written survey that you may receive in the mail after you visit with us. Thank you!        [facility] Information     [facility] gives you secure access to your electronic health record. If you see a primary care provider, you can also send messages to your care team and make appointments. If you have questions, please call your primary care clinic.  If you do not have a primary care provider, please call [phone] and they will assist you.        Care EveryWhere ID     This is your Care EveryWhere ID. This could be used by other organizations to access your [facility] medical records  [id number]        Equal Access to Services     BRANDY GARVEY : Morales Baker, waaxda luhien, qaybta kaallaura garcia. So [facility] [phone].    ATENCIÓN: Si habla español, tiene a leahy disposición servicios gratuitos de asistencia lingüística. Llame al [phone].    We  comply with applicable federal civil rights laws and [place] laws. We do not discriminate on the basis of race, color, national origin, age, disability, sex, sexual orientation, or gender identity.            After Visit Summary       This is your record. Keep this with you and show to your community pharmacist(s) and doctor(s) at your next visit.

## 2018-07-24 NOTE — ED PROVIDER NOTES
"  History     Chief Complaint   Patient presents with     Abdominal Pain     rlq     HPI  Zhao De Leon is a 38 year old male with a history of kidney stones, anxiety disorder, depressive disorder, and prediabetes who presents to the emergency department today for evaluation of abdominal pain. Patient began having pain this morning around 09:00.  He describes the pain as a sudden, sharp, pain that feels like someone is \"poking me with a stick\" underneath his right rib. Patient states that the pain was so strong and sudden that it caused him to fall to his knees. The pain only lasted about 10 second. Patient ate a protein bar for breakfast. Patient drank some water and then around 9:30 he had another sudden episode of pain. Between episodes, patient notes that he felt a dull pain and felt bloated.     Patient denies any fevers, vomiting, or diarrhea. His last bowel movement was around 12:30, and it was normal. He last ate at 15:30. Patient denies any previous abdominal surgeries. Patient denies any exacerbation caused by greasy or fatty foods. Patient passed a kidney stone before, and believes that pain was much worse than this pain.      Patient Active Problem List   Diagnosis     Generalized anxiety disorder     Hyperhidrosis of axilla     Tobacco abuse     Calcium oxalate dihydrate kidney stones     CARDIOVASCULAR SCREENING; LDL GOAL LESS THAN 160     Pain in shoulder     Tension headache     GERD (gastroesophageal reflux disease)     Polyp of colon     Major depressive disorder, recurrent episode, moderate (H)     Prediabetes     Current Outpatient Prescriptions   Medication Sig Dispense Refill     ibuprofen (ADVIL,MOTRIN) 800 MG tablet Take 1 tablet (800 mg) by mouth every 6 hours as needed for moderate pain 30 tablet 0     Naproxen Sodium (ALEVE PO) Take 440 mg by mouth 2 times daily as needed for moderate pain       Allergies   Allergen Reactions     Demerol Hives       Problem List:    Patient Active " Problem List    Diagnosis Date Noted     Prediabetes 11/07/2016     Priority: Medium     Major depressive disorder, recurrent episode, moderate (H) 01/06/2016     Priority: Medium     Wellbutrin caused headaches.        Polyp of colon 04/10/2013     Priority: Medium     GERD (gastroesophageal reflux disease) 04/04/2012     Priority: Medium     Pain in shoulder 03/22/2011     Priority: Medium     Tension headache 03/22/2011     Priority: Medium     CARDIOVASCULAR SCREENING; LDL GOAL LESS THAN 160 10/31/2010     Priority: Medium     Calcium oxalate dihydrate kidney stones 02/08/2010     Priority: Medium     Generalized anxiety disorder 09/22/2009     Priority: Medium     Diagnosis updated by automated process. Provider to review and confirm.       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     Priority: Medium     Tobacco abuse 09/22/2009     Priority: Medium        Past Medical History:    Past Medical History:   Diagnosis Date     Lumbago      Lumbar disc disease      Peptic ulcer, unspecified site, unspecified as acute or chronic, without mention of hemorrhage, perforation, or obstruction      Ulcer (H) 2000       Past Surgical History:    Past Surgical History:   Procedure Laterality Date     COLONOSCOPY  3/6/2013    Procedure: COLONOSCOPY;  Colonoscopy  ;  Surgeon: Aashish Sierra MD;  Location: [facility]     SURGICAL HISTORY OF -       Lumbar Disc Herniation       Family History:    Family History   Problem Relation Age of Onset     GASTROINTESTINAL DISEASE Father      hepatitis B     Blood Disease Father      hepatitis B     Cancer - colorectal Maternal Grandmother      Cancer - colorectal Paternal Grandmother      Coronary Artery Disease Maternal Grandfather 72     stroke       Social History:  Marital Status:   [2]  Social History   Substance Use Topics     Smoking status: Current Every Day Smoker     Packs/day: 0.50     Types: Other     Smokeless tobacco: Never Used      Comment:  1/2 to 1 ppd- he is using  the vapor.      Alcohol use Yes      Comment: occ- very rare, once per year.        Medications:      ibuprofen (ADVIL,MOTRIN) 800 MG tablet   Naproxen Sodium (ALEVE PO)         Review of Systems   Constitutional: Negative for fever.   Gastrointestinal: Positive for abdominal pain. Negative for diarrhea and vomiting.   All other systems are reviewed and are negative.      Physical Exam   BP: 133/82  Pulse: 103  Temp: 99  F (37.2  C)  Resp: 18  Weight: 90.3 kg (199 lb)  SpO2: 99 %      Physical Exam  Head:  Normocephalic.    Eyes:  PERRLA, full EOM.  External exams normal.    Ears:  Normal pinnae, canals, and TM's.    Nose:  Patent, without deformity.    Throat:  Moist mucous membranes without lesions, erythema, or exudate.    Neck:  Supple, without masses, lymphadenopathy or tenderness.    Respiratory:  Normal respiratory effort.  Lungs are clear with good breath sounds.    Heart:  RR without murmurs, rubs, or gallops.  Abdomen: No distention.  Bowel sounds are present.  No reproducible pain other than with deep palpation over the right upper quadrant.  Negative Guallpa sign.  No hepatomegaly.  No palpable mass or hernia.  Skin:  Smooth without excessive sweating, with normal hair distribution.  No suspicious lesions visible.  Psychiatric: Calm, attentive, normal thought process  ED Course   5:11 PM Patient Assessed.     ED Course     Procedures                   Results for orders placed or performed during the hospital encounter of 07/24/18   US Abdomen Limited    Narrative    US ABDOMEN LIMITED 7/24/2018 7:08 PM    CLINICAL INFORMATION: RUQ pain.     COMPARISON: 1/13/2017.    FINDINGS:  Limited right upper quadrant ultrasound is somewhat limited by bowel  gas. The gallbladder contains several mildly echogenic apparently  nonmobile nodules along the gallbladder wall which are likely polyps  measuring up to 0.7 cm. These were also seen previously. No shadowing  gallstones, gallbladder wall thickening or sonographic  Guallpa's sign.  No bile duct dilatation. Negative liver. Visualized portions of the   pancreas are negative. The visualized portion of the right kidney is  unremarkable. No hydronephrosis on the right. No free fluid in the  right upper quadrant.      Impression    IMPRESSION:  1. No acute sonographic findings in the right upper quadrant.    2. Gallbladder wall polyps. Consider surveillance imaging to document  stability (at approximately 6-12 months) if gallbladder is not  removed.   CBC with platelets differential   Result Value Ref Range    WBC 8.3 4.0 - 11.0 10e9/L    RBC Count 5.35 4.4 - 5.9 10e12/L    Hemoglobin 15.5 13.3 - 17.7 g/dL    Hematocrit 46.5 40.0 - 53.0 %    MCV 87 78 - 100 fl    MCH 29.0 26.5 - 33.0 pg    MCHC 33.3 31.5 - 36.5 g/dL    RDW 13.1 10.0 - 15.0 %    Platelet Count 223 150 - 450 10e9/L    Diff Method Automated Method     % Neutrophils 70.3 %    % Lymphocytes 21.4 %    % Monocytes 5.6 %    % Eosinophils 1.7 %    % Basophils 0.5 %    % Immature Granulocytes 0.5 %    Nucleated RBCs 0 0 /100    Absolute Neutrophil 5.8 1.6 - 8.3 10e9/L    Absolute Lymphocytes 1.8 0.8 - 5.3 10e9/L    Absolute Monocytes 0.5 0.0 - 1.3 10e9/L    Absolute Eosinophils 0.1 0.0 - 0.7 10e9/L    Absolute Basophils 0.0 0.0 - 0.2 10e9/L    Abs Immature Granulocytes 0.0 0 - 0.4 10e9/L    Absolute Nucleated RBC 0.0    Comprehensive metabolic panel   Result Value Ref Range    Sodium 138 133 - 144 mmol/L    Potassium 3.8 3.4 - 5.3 mmol/L    Chloride 105 94 - 109 mmol/L    Carbon Dioxide 28 20 - 32 mmol/L    Anion Gap 5 3 - 14 mmol/L    Glucose 175 (H) 70 - 99 mg/dL    Urea Nitrogen 15 7 - 30 mg/dL    Creatinine 0.94 0.66 - 1.25 mg/dL    GFR Estimate >90 >60 mL/min/1.7m2    GFR Estimate If Black >90 >60 mL/min/1.7m2    Calcium 8.7 8.5 - 10.1 mg/dL    Bilirubin Total 0.4 0.2 - 1.3 mg/dL    Albumin 4.0 3.4 - 5.0 g/dL    Protein Total 7.7 6.8 - 8.8 g/dL    Alkaline Phosphatase 97 40 - 150 U/L    ALT 27 0 - 70 U/L    AST 14 0 - 45  U/L   Lipase   Result Value Ref Range    Lipase 499 (H) 73 - 393 U/L   UA reflex to Microscopic   Result Value Ref Range    Color Urine Straw     Appearance Urine Clear     Glucose Urine Negative NEG^Negative mg/dL    Bilirubin Urine Negative NEG^Negative    Ketones Urine Negative NEG^Negative mg/dL    Specific Gravity Urine 1.005 1.003 - 1.035    Blood Urine Small (A) NEG^Negative    pH Urine 5.0 5.0 - 7.0 pH    Protein Albumin Urine Negative NEG^Negative mg/dL    Urobilinogen mg/dL 0.0 0.0 - 2.0 mg/dL    Nitrite Urine Negative NEG^Negative    Leukocyte Esterase Urine Negative NEG^Negative    Source Midstream Urine     RBC Urine 0 0 - 2 /HPF    WBC Urine 1 0 - 5 /HPF    Mucous Urine Present (A) NEG^Negative /LPF   Hemaglobin A1C   Result Value Ref Range    Hemoglobin A1C 6.3 (H) 0 - 5.6 %         Medications - No data to display    Assessments & Plan (with Medical Decision Making)  38 year male presents with right upper quadrant abdominal pain.  On 2 occasions today's had a few sharp jabs of pain that lasted just a few seconds.  Was not postprandial.  No other associated symptoms.  History for gallbladder polyps.  Largest being 0.7 cm.  Was seen by general surgery in January 2018 with no recommendation for cholecystectomy.  Did recommend ultrasound surveillance monitoring every 3-5 years.  Patient does not drink alcohol.  Does have a previous history for peptic ulcer.  Has not been using NSAIDs.  Examination notes a patient who is afebrile.  Does not appear in acute distress.  No jaundice.  Abdomen is benign.  Very slight reproducible pain with deep palpation only in the right upper quadrant.  CBC, CMP, UA, right upper quadrant ultrasound all unremarkable except for slight elevation in lipase which I do not believe is clinically significant.  Symptoms are not consistent with pancreatitis.  His pain would not be fleeting he would still be persistent.  Patient also request hemoglobin A1c for routine monitoring of  being a prediabetic.  Recommended he monitor for fever, postprandial symptoms, jaundice.  If noted return to his primary care provider.       I have reviewed the nursing notes.    I have reviewed the findings, diagnosis, plan and need for follow up with the patient.      New Prescriptions    No medications on file       Final diagnoses:   Gallbladder polyp   Abdominal pain, right upper quadrant   This document serves as a record of the services and decisions personally performed and made by Ramy Urbina, *. It was created on HIS/HER behalf by Sigrid Taylor, a trained medical scribe. The creation of this document is based on the provider's statements to the medical scribe.  Sigrid Taylor 5:11 PM 7/24/2018    Provider:  The information in this document, created by the medical scribe for me, accurately reflects the services I personally performed and the decisions made by me. I have reviewed and approved this document for accuracy prior to leaving the patient care area.  Ramy Urbina, * 5:11 PM 7/24/2018 7/24/2018   [facility] EMERGENCY DEPARTMENT     Ramy Urbina, DO  07/24/18 1935

## 2018-07-25 NOTE — DISCHARGE INSTRUCTIONS
Medical workup this evening shows no acute inflammatory or infectious process around the liver gallbladder.  There is no identified gallstones.  You do have gallbladder polyps.  These could be causing occasional obstruction to bile outflow of the gallbladder contracts which might contribute to occasional sharp pain in the right upper abdomen.  If you note frequent occurrence for pain in the right upper quadrant of the abdomen typically within 20 or 30 minutes after eating a meal that contains fat -you should then consider seeing general surgery for consideration of elective removal of the gallbladder(cholecystectomy).  This was done laparoscopically with 4 small half inch incisions in patients go home the same day with 1 week recovery.

## 2019-06-29 ENCOUNTER — HOSPITAL ENCOUNTER (EMERGENCY)
Facility: CLINIC | Age: 39
Discharge: HOME | End: 2019-06-29
Attending: FAMILY MEDICINE | Admitting: FAMILY MEDICINE
Payer: COMMERCIAL

## 2019-06-29 VITALS
WEIGHT: 219 LBS | BODY MASS INDEX: 30.98 KG/M2 | DIASTOLIC BLOOD PRESSURE: 81 MMHG | OXYGEN SATURATION: 97 % | HEART RATE: 95 BPM | RESPIRATION RATE: 16 BRPM | TEMPERATURE: 98 F | SYSTOLIC BLOOD PRESSURE: 125 MMHG

## 2019-06-29 DIAGNOSIS — R00.0 SINUS TACHYCARDIA: ICD-10-CM

## 2019-06-29 DIAGNOSIS — G44.209 TENSION HEADACHE: ICD-10-CM

## 2019-06-29 DIAGNOSIS — M79.10 MYALGIA: ICD-10-CM

## 2019-06-29 DIAGNOSIS — R25.1 SHAKINESS: ICD-10-CM

## 2019-06-29 DIAGNOSIS — R63.0 DECREASED APPETITE: ICD-10-CM

## 2019-06-29 LAB
ALBUMIN SERPL-MCNC: 4.2 G/DL (ref 3.4–5)
ALP SERPL-CCNC: 108 U/L (ref 40–150)
ALT SERPL W P-5'-P-CCNC: 57 U/L (ref 0–70)
ANION GAP SERPL CALCULATED.3IONS-SCNC: 8 MMOL/L (ref 3–14)
AST SERPL W P-5'-P-CCNC: 27 U/L (ref 0–45)
BASOPHILS # BLD AUTO: 0.1 10E9/L (ref 0–0.2)
BASOPHILS NFR BLD AUTO: 0.6 %
BILIRUB DIRECT SERPL-MCNC: <0.1 MG/DL (ref 0–0.2)
BILIRUB SERPL-MCNC: 0.3 MG/DL (ref 0.2–1.3)
BUN SERPL-MCNC: 19 MG/DL (ref 7–30)
CALCIUM SERPL-MCNC: 9 MG/DL (ref 8.5–10.1)
CHLORIDE SERPL-SCNC: 105 MMOL/L (ref 94–109)
CK SERPL-CCNC: 102 U/L (ref 30–300)
CO2 SERPL-SCNC: 26 MMOL/L (ref 20–32)
CREAT SERPL-MCNC: 1.05 MG/DL (ref 0.66–1.25)
D DIMER PPP FEU-MCNC: <0.3 UG/ML FEU (ref 0–0.5)
DIFFERENTIAL METHOD BLD: ABNORMAL
EOSINOPHIL # BLD AUTO: 0.1 10E9/L (ref 0–0.7)
EOSINOPHIL NFR BLD AUTO: 0.9 %
ERYTHROCYTE [DISTWIDTH] IN BLOOD BY AUTOMATED COUNT: 13 % (ref 10–15)
GFR SERPL CREATININE-BSD FRML MDRD: >90 ML/MIN/{1.73_M2}
GLUCOSE SERPL-MCNC: 118 MG/DL (ref 70–99)
HCT VFR BLD AUTO: 48.5 % (ref 40–53)
HGB BLD-MCNC: 16 G/DL (ref 13.3–17.7)
IMM GRANULOCYTES # BLD: 0.1 10E9/L (ref 0–0.4)
IMM GRANULOCYTES NFR BLD: 0.5 %
LIPASE SERPL-CCNC: 209 U/L (ref 73–393)
LYMPHOCYTES # BLD AUTO: 1.2 10E9/L (ref 0.8–5.3)
LYMPHOCYTES NFR BLD AUTO: 10.8 %
MCH RBC QN AUTO: 28.7 PG (ref 26.5–33)
MCHC RBC AUTO-ENTMCNC: 33 G/DL (ref 31.5–36.5)
MCV RBC AUTO: 87 FL (ref 78–100)
MONOCYTES # BLD AUTO: 0.6 10E9/L (ref 0–1.3)
MONOCYTES NFR BLD AUTO: 4.8 %
NEUTROPHILS # BLD AUTO: 9.4 10E9/L (ref 1.6–8.3)
NEUTROPHILS NFR BLD AUTO: 82.4 %
NRBC # BLD AUTO: 0 10*3/UL
NRBC BLD AUTO-RTO: 0 /100
PLATELET # BLD AUTO: 250 10E9/L (ref 150–450)
POTASSIUM SERPL-SCNC: 3.9 MMOL/L (ref 3.4–5.3)
PROT SERPL-MCNC: 8.2 G/DL (ref 6.8–8.8)
RBC # BLD AUTO: 5.57 10E12/L (ref 4.4–5.9)
SODIUM SERPL-SCNC: 139 MMOL/L (ref 133–144)
TSH SERPL DL<=0.005 MIU/L-ACNC: 0.79 MU/L (ref 0.4–4)
WBC # BLD AUTO: 11.4 10E9/L (ref 4–11)

## 2019-06-29 PROCEDURE — 84443 ASSAY THYROID STIM HORMONE: CPT | Performed by: FAMILY MEDICINE

## 2019-06-29 PROCEDURE — 99284 EMERGENCY DEPT VISIT MOD MDM: CPT | Mod: 25 | Performed by: FAMILY MEDICINE

## 2019-06-29 PROCEDURE — 25800030 ZZH RX IP 258 OP 636: Performed by: FAMILY MEDICINE

## 2019-06-29 PROCEDURE — 82550 ASSAY OF CK (CPK): CPT | Performed by: FAMILY MEDICINE

## 2019-06-29 PROCEDURE — 96374 THER/PROPH/DIAG INJ IV PUSH: CPT | Performed by: FAMILY MEDICINE

## 2019-06-29 PROCEDURE — 83690 ASSAY OF LIPASE: CPT | Performed by: FAMILY MEDICINE

## 2019-06-29 PROCEDURE — 85025 COMPLETE CBC W/AUTO DIFF WBC: CPT | Performed by: FAMILY MEDICINE

## 2019-06-29 PROCEDURE — 96361 HYDRATE IV INFUSION ADD-ON: CPT | Performed by: FAMILY MEDICINE

## 2019-06-29 PROCEDURE — 80048 BASIC METABOLIC PNL TOTAL CA: CPT | Performed by: FAMILY MEDICINE

## 2019-06-29 PROCEDURE — 85379 FIBRIN DEGRADATION QUANT: CPT | Performed by: FAMILY MEDICINE

## 2019-06-29 PROCEDURE — 99284 EMERGENCY DEPT VISIT MOD MDM: CPT | Mod: Z6 | Performed by: FAMILY MEDICINE

## 2019-06-29 PROCEDURE — 80076 HEPATIC FUNCTION PANEL: CPT | Performed by: FAMILY MEDICINE

## 2019-06-29 PROCEDURE — 25000128 H RX IP 250 OP 636: Performed by: FAMILY MEDICINE

## 2019-06-29 RX ORDER — ACETAMINOPHEN 500 MG
1000 TABLET ORAL EVERY 6 HOURS PRN
COMMUNITY
End: 2023-09-22

## 2019-06-29 RX ORDER — SODIUM CHLORIDE, SODIUM LACTATE, POTASSIUM CHLORIDE, CALCIUM CHLORIDE 600; 310; 30; 20 MG/100ML; MG/100ML; MG/100ML; MG/100ML
1000 INJECTION, SOLUTION INTRAVENOUS CONTINUOUS
Status: DISCONTINUED | OUTPATIENT
Start: 2019-06-29 | End: 2019-06-29 | Stop reason: HOSPADM

## 2019-06-29 RX ORDER — KETOROLAC TROMETHAMINE 30 MG/ML
30 INJECTION, SOLUTION INTRAMUSCULAR; INTRAVENOUS ONCE
Status: COMPLETED | OUTPATIENT
Start: 2019-06-29 | End: 2019-06-29

## 2019-06-29 RX ADMIN — SODIUM CHLORIDE, POTASSIUM CHLORIDE, SODIUM LACTATE AND CALCIUM CHLORIDE 1000 ML: 600; 310; 30; 20 INJECTION, SOLUTION INTRAVENOUS at 15:23

## 2019-06-29 RX ADMIN — SODIUM CHLORIDE, POTASSIUM CHLORIDE, SODIUM LACTATE AND CALCIUM CHLORIDE 1000 ML: 600; 310; 30; 20 INJECTION, SOLUTION INTRAVENOUS at 16:51

## 2019-06-29 RX ADMIN — KETOROLAC TROMETHAMINE 30 MG: 30 INJECTION, SOLUTION INTRAMUSCULAR at 16:39

## 2019-06-29 ASSESSMENT — ENCOUNTER SYMPTOMS
WHEEZING: 0
DYSURIA: 0
FEVER: 0
DIARRHEA: 0
DIAPHORESIS: 0
SORE THROAT: 0
COUGH: 0
BLOOD IN STOOL: 0
HEADACHES: 1
SINUS PRESSURE: 0
CHILLS: 0
VOMITING: 0
ABDOMINAL PAIN: 0
APPETITE CHANGE: 1
NAUSEA: 0
FREQUENCY: 0
CONSTIPATION: 0
SHORTNESS OF BREATH: 1
PALPITATIONS: 1

## 2019-06-29 NOTE — ED AVS SNAPSHOT
[facility] Emergency Department  [address] [place] [address]  Phone:  [phone]  Fax:  [phone]                                    Zhao De Leon   MRN: [id number]    Department:  [facility] Emergency Department   Date of Visit:  6/29/2019           After Visit Summary Signature Page    I have received my discharge instructions, and my questions have been answered. I have discussed any challenges I see with this plan with the nurse or doctor.    ..........................................................................................................................................  Patient/Patient Representative Signature      ..........................................................................................................................................  Patient Representative Print Name and Relationship to Patient    ..................................................               ................................................  Date                                   Time    ..........................................................................................................................................  Reviewed by Signature/Title    ...................................................              ..............................................  Date                                               Time          22EPIC Rev 08/18

## 2019-06-29 NOTE — DISCHARGE INSTRUCTIONS
ICD-10-CM    1. Tension headache G44.209     suspect this may be due to cervical neck strain. maintain neck range of motion. ibuprofen as needed.  stress reduction. stay hydrated.  return for worsening, fever.  follow-up clinic   2. Decreased appetite R63.0    3. Shakiness R25.1    4. Sinus tachycardia R00.0     no serious findings.  stay hydrated 64 oz fluid/day - non-caffeinated.  recheck in clinic   5. Myalgia M79.10

## 2019-06-29 NOTE — ED PROVIDER NOTES
History     Chief Complaint   Patient presents with     Headache     HPI  Zhao De Leon is a 39 year old male who presents with history of prediabetes    headache for 3-4 days.  bitemporal and periorbital. No associated nausea/vomiting, light sensitivity.  sense of blurred vision periodically.  No diplopia.  does not typically get headaches.  No neck stiffness. No head or neck injury. some neck/shoulder pain. No fever.      feeling shaky and weak.  was at driving range today after 1/2 bucket - felt leg weakness.  was hitting from shade and was outside for <1 hour.  no obvious heat related illness    decreased appetite over the last 2 weeks.    no polyuria, polydipsia    tachycardia today noted by apple watch - up to 120s. periodic dyspnea. quit tobacco.      Denies recent prolonged travel (>3 hours) by car or plane, history or FHx of venous thromboembolism, recent surgery (last 4 weeks), active cancer history, hypercoagulable state, estrogen or other medications/conditions causing VTE or  new unilateral swelling or pain in the legs or calves.    denies tick bites.      Prior tobacco abuse but is since transition to E-cig is working off of this now.  Occasional use of alcohol.  No other substance use.    Allergies:  Allergies   Allergen Reactions     Demerol Hives       Problem List:    Patient Active Problem List    Diagnosis Date Noted     Prediabetes 11/07/2016     Priority: Medium     Major depressive disorder, recurrent episode, moderate (H) 01/06/2016     Priority: Medium     Wellbutrin caused headaches.        Polyp of colon 04/10/2013     Priority: Medium     GERD (gastroesophageal reflux disease) 04/04/2012     Priority: Medium     Pain in shoulder 03/22/2011     Priority: Medium     Tension headache 03/22/2011     Priority: Medium     CARDIOVASCULAR SCREENING; LDL GOAL LESS THAN 160 10/31/2010     Priority: Medium     Calcium oxalate dihydrate kidney stones 02/08/2010     Priority: Medium      Generalized anxiety disorder 09/22/2009     Priority: Medium     Diagnosis updated by automated process. Provider to review and confirm.       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     Priority: Medium     Tobacco abuse 09/22/2009     Priority: Medium        Past Medical History:    Past Medical History:   Diagnosis Date     Lumbago      Lumbar disc disease      Peptic ulcer, unspecified site, unspecified as acute or chronic, without mention of hemorrhage, perforation, or obstruction      Ulcer (H) 2000       Past Surgical History:    Past Surgical History:   Procedure Laterality Date     COLONOSCOPY  3/6/2013    Procedure: COLONOSCOPY;  Colonoscopy  ;  Surgeon: Aashish Sierra MD;  Location: [facility]     SURGICAL HISTORY OF -       Lumbar Disc Herniation       Family History:    Family History   Problem Relation Age of Onset     Gastrointestinal Disease Father         hepatitis B     Blood Disease Father         hepatitis B     Cancer - colorectal Maternal Grandmother      Cancer - colorectal Paternal Grandmother      Coronary Artery Disease Maternal Grandfather 72        stroke       Social History:  Marital Status:   [2]  Social History     Tobacco Use     Smoking status: Current Every Day Smoker     Packs/day: 0.50     Types: Other     Smokeless tobacco: Never Used     Tobacco comment:  1/2 to 1 ppd- he is using the vapor.    Substance Use Topics     Alcohol use: Yes     Comment: occ- very rare, once per year.     Drug use: No        Medications:      No current outpatient medications on file.      Review of Systems   Constitutional: Positive for appetite change. Negative for chills, diaphoresis and fever.   HENT: Negative for ear pain, sinus pressure and sore throat.    Eyes: Positive for visual disturbance.   Respiratory: Positive for shortness of breath. Negative for cough and wheezing.    Cardiovascular: Positive for palpitations. Negative for chest pain.   Gastrointestinal: Negative for abdominal  pain, blood in stool, constipation, diarrhea, nausea and vomiting.   Genitourinary: Negative for dysuria, frequency and urgency.   Skin: Negative for rash.   Neurological: Positive for headaches.   All other systems reviewed and are negative.    tinnitus  Physical Exam   BP: 129/78  Pulse: 114  Temp: 98.2  F (36.8  C)  Resp: 18  Weight: 99.3 kg (219 lb)  SpO2: 95 %      Physical Exam   Constitutional: He is oriented to person, place, and time. He appears distressed.   HENT:   Head: Atraumatic.   Mouth/Throat: Oropharynx is clear and moist.   Neck: Neck supple.   Cardiovascular: Normal rate and regular rhythm. Exam reveals no friction rub.   No murmur heard.  Pulmonary/Chest: Effort normal and breath sounds normal. No stridor. No respiratory distress. He has no wheezes.   Abdominal: Soft. Bowel sounds are normal. He exhibits no distension and no mass. There is no tenderness. There is no guarding.   Musculoskeletal: He exhibits no edema.   Neurological: He is alert and oriented to person, place, and time. No cranial nerve deficit. He exhibits normal muscle tone. Coordination normal.   Skin: No rash noted. He is not diaphoretic. No pallor.       ED Course        Procedures                  EKG Interpretation:      Interpreted by Yann Hathaway MD  EKG done at 1620 hrs. demonstrates a sinus rhythm at 93 bpm with a normal axis and no ST change.  No T wave changes.  Normal R progression.  There are no Q waves although small isolated Q in lead III of approximately 1 mm there is also a small S wave and T wave flattening in lead III.  Normal intervals.  Normal conduction.  No ectopy.  Impression sinus rhythm at 93 bpm there is a isolated S1Q3T3 strain pattern possibly on EKG although this is very subtle.  There is no change from EKG otherwise done back in January 2017.    Critical Care time:  none               Results for orders placed or performed during the hospital encounter of 06/29/19 (from the past 24 hour(s))   CBC  with platelets differential   Result Value Ref Range    WBC 11.4 (H) 4.0 - 11.0 10e9/L    RBC Count 5.57 4.4 - 5.9 10e12/L    Hemoglobin 16.0 13.3 - 17.7 g/dL    Hematocrit 48.5 40.0 - 53.0 %    MCV 87 78 - 100 fl    MCH 28.7 26.5 - 33.0 pg    MCHC 33.0 31.5 - 36.5 g/dL    RDW 13.0 10.0 - 15.0 %    Platelet Count 250 150 - 450 10e9/L    Diff Method Automated Method     % Neutrophils 82.4 %    % Lymphocytes 10.8 %    % Monocytes 4.8 %    % Eosinophils 0.9 %    % Basophils 0.6 %    % Immature Granulocytes 0.5 %    Nucleated RBCs 0 0 /100    Absolute Neutrophil 9.4 (H) 1.6 - 8.3 10e9/L    Absolute Lymphocytes 1.2 0.8 - 5.3 10e9/L    Absolute Monocytes 0.6 0.0 - 1.3 10e9/L    Absolute Eosinophils 0.1 0.0 - 0.7 10e9/L    Absolute Basophils 0.1 0.0 - 0.2 10e9/L    Abs Immature Granulocytes 0.1 0 - 0.4 10e9/L    Absolute Nucleated RBC 0.0    Basic metabolic panel   Result Value Ref Range    Sodium 139 133 - 144 mmol/L    Potassium 3.9 3.4 - 5.3 mmol/L    Chloride 105 94 - 109 mmol/L    Carbon Dioxide 26 20 - 32 mmol/L    Anion Gap 8 3 - 14 mmol/L    Glucose 118 (H) 70 - 99 mg/dL    Urea Nitrogen 19 7 - 30 mg/dL    Creatinine 1.05 0.66 - 1.25 mg/dL    GFR Estimate >90 >60 mL/min/[1.73_m2]    GFR Estimate If Black >90 >60 mL/min/[1.73_m2]    Calcium 9.0 8.5 - 10.1 mg/dL   D dimer quantitative   Result Value Ref Range    D Dimer <0.3 0.0 - 0.50 ug/ml FEU   CK total   Result Value Ref Range    CK Total 102 30 - 300 U/L   Hepatic panel   Result Value Ref Range    Bilirubin Direct <0.1 0.0 - 0.2 mg/dL    Bilirubin Total 0.3 0.2 - 1.3 mg/dL    Albumin 4.2 3.4 - 5.0 g/dL    Protein Total 8.2 6.8 - 8.8 g/dL    Alkaline Phosphatase 108 40 - 150 U/L    ALT 57 0 - 70 U/L    AST 27 0 - 45 U/L   TSH with free T4 reflex   Result Value Ref Range    TSH 0.79 0.40 - 4.00 mU/L   Lipase   Result Value Ref Range    Lipase 209 73 - 393 U/L       Medications   lactated ringers BOLUS 1,000 mL (1,000 mLs Intravenous New Bag 6/29/19 1520)    lactated ringers infusion (has no administration in time range)       Assessments & Plan (with Medical Decision Making)     MDM: Zhao De Leon is a 39 year old male presenting with history of prior tobacco abuse, tension headaches, depression, prediabetes, history of calcium oxalate stones, generalized anxiety who presents with several days of atypical symptoms of frontal and periorbital headache without associated nausea vomiting or light sensitivity although he does have a decreased appetite these last few days.  Had some myalgias in the lower extremities as well and felt at times shaky and dizzy.  He has not had a thunderclap headache.  There is no head trauma.  No associated fever.  No known tick bites.  He also experienced a period of tinnitus.  Periodic palpitations and tachycardia earlier today.    Unclear cause for his current symptoms -and unclear if these are related to one another.  His headache history demonstrates no red flags and his neurologic exam here is reassuring with normal motor strength sensation cranial nerve exam and coordination.  He has no fever and is nontoxic in appearance.    His sense and overall weakness, bilateral blurred vision at times without polyuria polydipsia is also unclear.  No obvious deficit today here in the emergency department.    His tachycardia is unclear.  He has no VTE risk factors but he does have a sense periodically of dyspnea versus weakness and therefore a EKG, d-dimer were performed and were negative.    Also with decreased appetite as well.    Broad evaluation was performed and is reassuring as above.    Additional recommendations are as below.  Precautions given for return      I have reviewed the nursing notes.    I have reviewed the findings, diagnosis, plan and need for follow up with the patient.          Medication List      There are no discharge medications for this visit.         Final diagnoses:   Tension headache - suspect this may be due  to cervical neck strain. maintain neck range of motion. ibuprofen as needed.  stress reduction. stay hydrated.  return for worsening, fever.  follow-up clinic   Decreased appetite   Shakiness   Sinus tachycardia - no serious findings.  stay hydrated 64 oz fluid/day - non-caffeinated.  recheck in clinic   Myalgia       6/29/2019   [facility] EMERGENCY DEPARTMENT     Yann Hathaway MD  06/29/19 [id number]

## 2019-06-29 NOTE — ED TRIAGE NOTES
"HA off/on since Tuesday, does go away, HA started again today approx 1 hour PTA, pain worse and feels \"shaky\". No weakness.  "

## 2019-07-10 ENCOUNTER — HOSPITAL ENCOUNTER (OUTPATIENT)
Dept: CT IMAGING | Facility: CLINIC | Age: 39
Discharge: HOME | End: 2019-07-10
Attending: FAMILY MEDICINE | Admitting: FAMILY MEDICINE
Payer: COMMERCIAL

## 2019-07-10 ENCOUNTER — OFFICE VISIT (OUTPATIENT)
Dept: FAMILY MEDICINE | Facility: CLINIC | Age: 39
End: 2019-07-10
Payer: COMMERCIAL

## 2019-07-10 VITALS
OXYGEN SATURATION: 98 % | DIASTOLIC BLOOD PRESSURE: 77 MMHG | WEIGHT: 218.5 LBS | BODY MASS INDEX: 30.59 KG/M2 | TEMPERATURE: 99.2 F | RESPIRATION RATE: 18 BRPM | HEIGHT: 71 IN | HEART RATE: 89 BPM | SYSTOLIC BLOOD PRESSURE: 121 MMHG

## 2019-07-10 DIAGNOSIS — R51.9 NONINTRACTABLE EPISODIC HEADACHE, UNSPECIFIED HEADACHE TYPE: Primary | ICD-10-CM

## 2019-07-10 DIAGNOSIS — R51.9 NONINTRACTABLE EPISODIC HEADACHE, UNSPECIFIED HEADACHE TYPE: ICD-10-CM

## 2019-07-10 DIAGNOSIS — R94.31 ABNORMAL ELECTROCARDIOGRAM: ICD-10-CM

## 2019-07-10 PROCEDURE — 70450 CT HEAD/BRAIN W/O DYE: CPT

## 2019-07-10 PROCEDURE — 99214 OFFICE O/P EST MOD 30 MIN: CPT | Performed by: FAMILY MEDICINE

## 2019-07-10 ASSESSMENT — ANXIETY QUESTIONNAIRES
3. WORRYING TOO MUCH ABOUT DIFFERENT THINGS: MORE THAN HALF THE DAYS
2. NOT BEING ABLE TO STOP OR CONTROL WORRYING: SEVERAL DAYS
1. FEELING NERVOUS, ANXIOUS, OR ON EDGE: SEVERAL DAYS
7. FEELING AFRAID AS IF SOMETHING AWFUL MIGHT HAPPEN: NOT AT ALL
5. BEING SO RESTLESS THAT IT IS HARD TO SIT STILL: MORE THAN HALF THE DAYS
6. BECOMING EASILY ANNOYED OR IRRITABLE: MORE THAN HALF THE DAYS
GAD7 TOTAL SCORE: 10
IF YOU CHECKED OFF ANY PROBLEMS ON THIS QUESTIONNAIRE, HOW DIFFICULT HAVE THESE PROBLEMS MADE IT FOR YOU TO DO YOUR WORK, TAKE CARE OF THINGS AT HOME, OR GET ALONG WITH OTHER PEOPLE: SOMEWHAT DIFFICULT

## 2019-07-10 ASSESSMENT — MIFFLIN-ST. JEOR: SCORE: 1938.24

## 2019-07-10 ASSESSMENT — PATIENT HEALTH QUESTIONNAIRE - PHQ9
SUM OF ALL RESPONSES TO PHQ QUESTIONS 1-9: 15
5. POOR APPETITE OR OVEREATING: MORE THAN HALF THE DAYS

## 2019-07-10 NOTE — PROGRESS NOTES
Subjective     Zhao De Leon is a 39 year old male who presents to clinic today for the following health issues:    HPI   ED/UC Followup:    Facility:  [facility] ED  Date of visit: 6/29/2019  Reason for visit: decreased appetite, tension headache, sinus tachycardia  Current Status: still c/o headaches 3-4 days a week; he is feeling well in the am, and has some symptoms in the am and then in the afternoon. It usually dissipates near bedtime. There is a constant headache. No scotomata, but he did have some visual blurriness. No stiff neck.     He uses 2-3 cans of Mountain Dew per day. He is tapering from up to 12 per day.      The ED note showed:   Assessments & Plan (with Medical Decision Making)      MDM: Zhao De Leon is a 39 year old male presenting with history of prior tobacco abuse, tension headaches, depression, prediabetes, history of calcium oxalate stones, generalized anxiety who presents with several days of atypical symptoms of frontal and periorbital headache without associated nausea vomiting or light sensitivity although he does have a decreased appetite these last few days.  Had some myalgias in the lower extremities as well and felt at times shaky and dizzy.  He has not had a thunderclap headache.  There is no head trauma.  No associated fever.  No known tick bites.  He also experienced a period of tinnitus.  Periodic palpitations and tachycardia earlier today.   Unclear cause for his current symptoms -and unclear if these are related to one another.  His headache history demonstrates no red flags and his neurologic exam here is reassuring with normal motor strength sensation cranial nerve exam and coordination.  He has no fever and is nontoxic in appearance.   His sense and overall weakness, bilateral blurred vision at times without polyuria polydipsia is also unclear.  No obvious deficit today here in the emergency department.   His tachycardia is unclear.  He has no VTE risk  factors but he does have a sense periodically of dyspnea versus weakness and therefore a EKG, d-dimer were performed and were negative.  Also with decreased appetite as well.   Broad evaluation was performed ands reassuring as above.   Additional recommendations are as below.  Precautions given for return     Headaches      Duration: since first week in June 2019    Description  Location: unilateral in the bilateral temporal area, unilateral in the bilateral occipital area   Character: throbbing pain, squeezing pain  Frequency:  3-4 days a week  Duration:  Last all day long    Intensity:  moderate    Accompanying signs and symptoms:    Precipitating or Alleviating factors:  Nausea/vomiting: no  Dizziness: no  Weakness or numbness: no  Visual changes: blurred vision in both eyes  Fever: no   Sinus or URI symptoms no     History  Head trauma: no  Family history of migraines: no  Previous tests for headaches: no  Neurologist evaluations: no  Able to do daily activities when headache present: YES  Wake with headaches: YES  Daily pain medication use: no  Any changes in: none    Precipitating or Alleviating factors (light/sound/sleep/caffeine): 2-3 cans of MountainDew daily    Therapies tried and outcome: tried cutting back on caffeine    Outcome - not effective  Frequent/daily pain medication use: no        Current Outpatient Medications:      acetaminophen (TYLENOL) 500 MG tablet, Take 1,000 mg by mouth every 6 hours as needed for mild pain, Disp: , Rfl:    Rare OTC meds.     Patient Active Problem List   Diagnosis     Generalized anxiety disorder     Hyperhidrosis of axilla     Tobacco abuse     Calcium oxalate dihydrate kidney stones     CARDIOVASCULAR SCREENING; LDL GOAL LESS THAN 160     Pain in shoulder     Tension headache     GERD (gastroesophageal reflux disease)     Polyp of colon     Major depressive disorder, recurrent episode, moderate (H)     Prediabetes       Blood pressure 121/77, pulse 89, temperature  "99.2  F (37.3  C), temperature source Tympanic, resp. rate 18, height 1.803 m (5' 11\"), weight 99.1 kg (218 lb 8 oz), SpO2 98 %.  Exam:  GENERAL APPEARANCE: healthy, alert and no distress  EYES: EOMI,  PERRL  NECK: no adenopathy, no asymmetry, masses, or scars and thyroid normal to palpation  CV: regular rates and rhythm, normal S1 S2, no S3 or S4 and no murmur, click or rub -  MS: extremities normal- no gross deformities noted, no evidence of inflammation in joints, FROM in all extremities.  SKIN: no suspicious lesions or rashes  NEURO: Normal strength and tone, sensory exam grossly normal, mentation intact and speech normal  PSYCH: mentation appears normal and affect normal/bright    (R51) Nonintractable episodic headache, unspecified headache type  (primary encounter diagnosis)  Comment:   Plan: CT Head w/o Contrast, NEUROLOGY ADULT REFERRAL        Taper the caffeine to zero. Avoid other stimulants. You Tylenol as needed. Avoid advil and aleve and aleve. Use ice on the forehead and head on the back of the neck.   Consider a symptom diary for three events, to find a trigger to avoid. I will order the CT scan of the head. We will call the results. Also a Neurology referral is done for Yuko GEORGE   If there are any new neurological symptoms then recheck right away.     (R94.31) Abnormal electrocardiogram  Comment:   Plan: Echocardiogram Complete        For the abnormal EKG, we discussed the issues and the echocardiogram is ordered. Call [phone] to schedule.   We will call the results. Monitor for the symptoms of angina as discussed. If the echo shows old damage, then a referral to Cardiology would be done.       Mando Curtis    "

## 2019-07-10 NOTE — PATIENT INSTRUCTIONS
(R51) Nonintractable episodic headache, unspecified headache type  (primary encounter diagnosis)  Comment:   Plan: CT Head w/o Contrast, NEUROLOGY ADULT REFERRAL        Taper the caffeine to zero. Avoid other stimulants. You Tylenol as needed. Avoid advil and aleve and aleve. Use ice on the forehead and head on the back of the neck.   Consider a symptom diary for three events, to find a trigger to avoid. I will order the CT scan of the head. We will call the results. Also a Neurology referral is done for Yuko ROBERTS.   If there are any new neurological symptoms then recheck right away.     (R94.31) Abnormal electrocardiogram  Comment:   Plan: Echocardiogram Complete        For the abnormal EKG, we discussed the issues and the echocardiogram is ordered. Call [phone] to schedule.   We will call the results. Monitor for the symptoms of angina as discussed. If the echo shows old damage, then a referral to Cardiology would be done.

## 2019-07-11 ENCOUNTER — TELEPHONE (OUTPATIENT)
Dept: NEUROLOGY | Facility: CLINIC | Age: 39
End: 2019-07-11

## 2019-07-11 ASSESSMENT — ANXIETY QUESTIONNAIRES: GAD7 TOTAL SCORE: 10

## 2019-07-11 NOTE — TELEPHONE ENCOUNTER
FUTURE VISIT INFORMATION        FUTURE VISIT INFORMATION:    Date: 7/15/19    Time:  1000    Location: [facility]   REFERRAL INFORMATION:    Referring provider:  Mando Curtis MD    Referring providers clinic:  [facility]     Reason for visit/diagnosis:  Headache        NOTES (FOR ALL VISITS) STATUS DETAILS   OFFICE NOTE from referring provider Printed 7/10/19   OFFICE NOTE from other specialist Printed  Dr. Jara (neuro) 9/13/10  Dr. Lutz (PM&R) 11/17/11     DISCHARGE SUMMARY from hospital      DISCHARGE REPORT from the ER Printed [place] 6/29/19   MEDICATION LIST In Epic     IMAGING  (FOR ALL VISITS)       EMG      EEG      MRI (HEAD, NECK, SPINE)  Reports printed   Images in PACs   CT head 7/10/19  MRI thoracic 9/8/11  MRI brain 9/24/10  No cervical images found   CARDIAC STUDIES      FORMAL COGNITIVE TESTING      OTHER

## 2019-07-15 ENCOUNTER — OFFICE VISIT (OUTPATIENT)
Dept: NEUROLOGY | Facility: CLINIC | Age: 39
End: 2019-07-15
Payer: COMMERCIAL

## 2019-07-15 VITALS
TEMPERATURE: 97.4 F | RESPIRATION RATE: 12 BRPM | DIASTOLIC BLOOD PRESSURE: 67 MMHG | SYSTOLIC BLOOD PRESSURE: 120 MMHG | HEART RATE: 86 BPM

## 2019-07-15 DIAGNOSIS — G44.219 EPISODIC TENSION-TYPE HEADACHE, NOT INTRACTABLE: Primary | ICD-10-CM

## 2019-07-15 PROCEDURE — 99204 OFFICE O/P NEW MOD 45 MIN: CPT | Performed by: PSYCHIATRY & NEUROLOGY

## 2019-07-15 ASSESSMENT — PAIN SCALES - GENERAL: PAINLEVEL: MODERATE PAIN (4)
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
## 2019-07-15 NOTE — PATIENT INSTRUCTIONS
Plan:    Tension-Type Headache treatment options:  -- Amitriptyline. We would taper the dose up to effectiveness. This might also help with your sleep. *Information provided.   -- Acupuncture. I can refer you on to Dr. Milton or Dr. Quiñonez for this if you are interested.   -- Consider a Sleep consultation, as we discussed.     Return to Neurology as needed. If you decide to start the amitriptyline, I will want you to come back in about 3 months.       Patient Education     Tension Headache    A muscle tension headache is a very common cause of head pain. It s also called a stress headache. When some people are under stress, they tense the muscles of their shoulder, neck, and scalp without knowing it. If this tension lasts long enough, a headache can occur. A tension headache can be quite painful. It can last for hours or even days.  Home care  Follow these tips when caring for yourself at home:    Don t drive yourself home if you were given pain medicine for your headache. Instead, have someone else drive you home. Try to sleep when you get home. You should feel much better when you wake up.    Put heat on the back of your neck to help ease neck spasm.    Drink only clear liquids or eat a light diet until your symptoms get better. This will help you avoid nausea or vomiting.  How to prevent headaches    Figure out what is causing stress in your life. Learn new ways to handle your stress. Ideas include regular exercise, biofeedback, self-hypnosis, yoga, and meditation. Talk with your healthcare provider to find out more information about managing stress. Many books and digital media are also available on this subject.    Take time out at the first sign of a tension headache, if possible. Take yourself out of the stressful situation. Find a quiet, comfortable place to sit or lie down and let yourself relax. Heat and deep massage of the tight areas in the neck and shoulders may help ease muscle spasm. You may also  get relief from a medicine like ibuprofen or a prescribed muscle relaxant.  Follow-up care  Follow up with your healthcare provider, or as advised. Talk with your provider if you have frequent headaches. He or she can figure out a treatment plan. Ask if you can have medicine to take at home the next time you get a bad headache. This may keep you from having to visit the emergency department in the future. You may need to see a headache specialist (neurologist) if you continue to have headaches.  When to seek medical advice  Call your healthcare provider right away if any of these occur:    Your head pain gets worse during sexual intercourse or strenuous activity    Your head pain doesn t get better within 24 hours    You aren t able to keep liquids down (repeated vomiting)    Fever of 100.4 F (38 C) or higher, or as directed by your healthcare provider    Stiff neck    Extreme drowsiness, confusion, or fainting    Dizziness or dizziness with spinning sensation (vertigo)    Weakness in an arm or leg or one side of your face    You have difficulty speaking    Your vision changes  Date Last Reviewed: 8/1/2016 2000-2018 [employer]. [address] [place] [address], [place] [address]. All rights reserved. This information is not intended as a substitute for professional medical care. Always follow your healthcare professional's instructions.
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
## 2019-07-18 ENCOUNTER — HOSPITAL ENCOUNTER (OUTPATIENT)
Dept: CARDIOLOGY | Facility: CLINIC | Age: 39
Discharge: HOME | End: 2019-07-18
Attending: FAMILY MEDICINE | Admitting: FAMILY MEDICINE
Payer: COMMERCIAL

## 2019-07-18 DIAGNOSIS — R94.31 ABNORMAL ELECTROCARDIOGRAM: ICD-10-CM

## 2019-07-18 PROCEDURE — 40000264 ECHOCARDIOGRAM COMPLETE

## 2019-07-18 PROCEDURE — 93306 TTE W/DOPPLER COMPLETE: CPT | Mod: 26 | Performed by: INTERNAL MEDICINE

## 2019-07-18 PROCEDURE — 25500064 ZZH RX 255 OP 636: Performed by: FAMILY MEDICINE

## 2019-07-18 RX ADMIN — HUMAN ALBUMIN MICROSPHERES AND PERFLUTREN 9 ML: 10; .22 INJECTION, SOLUTION INTRAVENOUS at 15:35

## 2019-10-10 ENCOUNTER — OFFICE VISIT (OUTPATIENT)
Dept: FAMILY MEDICINE | Facility: CLINIC | Age: 39
End: 2019-10-10
Payer: COMMERCIAL

## 2019-10-10 VITALS
OXYGEN SATURATION: 99 % | HEART RATE: 92 BPM | RESPIRATION RATE: 16 BRPM | BODY MASS INDEX: 30.8 KG/M2 | SYSTOLIC BLOOD PRESSURE: 118 MMHG | WEIGHT: 220 LBS | DIASTOLIC BLOOD PRESSURE: 62 MMHG | TEMPERATURE: 97.2 F | HEIGHT: 71 IN

## 2019-10-10 DIAGNOSIS — F33.1 MAJOR DEPRESSIVE DISORDER, RECURRENT EPISODE, MODERATE (H): Primary | ICD-10-CM

## 2019-10-10 DIAGNOSIS — G47.00 INSOMNIA, UNSPECIFIED TYPE: ICD-10-CM

## 2019-10-10 PROCEDURE — 99214 OFFICE O/P EST MOD 30 MIN: CPT | Performed by: FAMILY MEDICINE

## 2019-10-10 RX ORDER — TRAZODONE HYDROCHLORIDE 50 MG/1
50 TABLET, FILM COATED ORAL AT BEDTIME
Qty: 30 TABLET | Refills: 5 | Status: SHIPPED | OUTPATIENT
Start: 2019-10-10 | End: 2023-06-26

## 2019-10-10 RX ORDER — PAROXETINE 10 MG/1
20 TABLET, FILM COATED ORAL EVERY MORNING
Qty: 30 TABLET | Refills: 1 | Status: SHIPPED | OUTPATIENT
Start: 2019-10-10 | End: 2019-11-08

## 2019-10-10 ASSESSMENT — PATIENT HEALTH QUESTIONNAIRE - PHQ9
SUM OF ALL RESPONSES TO PHQ QUESTIONS 1-9: 11
5. POOR APPETITE OR OVEREATING: SEVERAL DAYS

## 2019-10-10 ASSESSMENT — ANXIETY QUESTIONNAIRES
3. WORRYING TOO MUCH ABOUT DIFFERENT THINGS: MORE THAN HALF THE DAYS
5. BEING SO RESTLESS THAT IT IS HARD TO SIT STILL: SEVERAL DAYS
7. FEELING AFRAID AS IF SOMETHING AWFUL MIGHT HAPPEN: SEVERAL DAYS
2. NOT BEING ABLE TO STOP OR CONTROL WORRYING: SEVERAL DAYS
6. BECOMING EASILY ANNOYED OR IRRITABLE: MORE THAN HALF THE DAYS
1. FEELING NERVOUS, ANXIOUS, OR ON EDGE: MORE THAN HALF THE DAYS
GAD7 TOTAL SCORE: 10
IF YOU CHECKED OFF ANY PROBLEMS ON THIS QUESTIONNAIRE, HOW DIFFICULT HAVE THESE PROBLEMS MADE IT FOR YOU TO DO YOUR WORK, TAKE CARE OF THINGS AT HOME, OR GET ALONG WITH OTHER PEOPLE: SOMEWHAT DIFFICULT

## 2019-10-10 ASSESSMENT — MIFFLIN-ST. JEOR: SCORE: 1945.04

## 2019-10-10 NOTE — PROGRESS NOTES
Subjective     Zhao De Leon is a 39 year old male who presents to clinic today for the following health issues:    HPI   Depression and Anxiety Follow-Up    How are you doing with your depression since your last visit? More symptoms in the past 2-3 months.    How are you doing with your anxiety since your last visit?  He is not sure if he is having anxiety symptoms.    Are you having other symptoms that might be associated with depression or anxiety? Yes:  He is feeling fatigue and irritable.  Feels like he doesn't want to do anything.     Have you had a significant life event? Relationship Concerns and Job ConcernsGetting a divorce, decreasing his work hours.     Do you have any concerns with your use of alcohol or other drugs? 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 He has changed to a less stressful position.   He is getting a divorce.     Social History     Tobacco Use     Smoking status: Current Every Day Smoker     Packs/day: 0.50     Types: Other     Smokeless tobacco: Never Used     Tobacco comment:  1/2 to 1 ppd- he is using the vapor.    Substance Use Topics     Alcohol use: Yes     Comment: occ- very rare, once per year.     Drug use: No     PHQ 3/17/2016 11/7/2016 7/10/2019   PHQ-9 Total Score 4 3 15   Q9: Thoughts of better off dead/self-harm past 2 weeks Not at all Not at all Not at all     RICARDO-7 SCORE 3/17/2016 11/7/2016 7/10/2019   Total Score 4 4 10     No flowsheet data found.  No flowsheet data found.      Suicide Assessment Five-step Evaluation and Treatment (SAFE-T)      How many servings of fruits and vegetables do you eat daily?  0-1    On average, how many sweetened beverages do you drink each day (soda, juice, sweet tea, etc)?   1 bottle, 20 oz size.    How many days per week do you miss taking your medication? No current medication.      He was on Wellbutrin in the past.  He did not like how this medication made him feel, caused headaches.    Today the  "PHQ is 11, and the RICARDO is 10.     PHQ-9 ([employer]) 7/10/2019   1.  Little interest or pleasure in doing things 2   2.  Feeling down, depressed, or hopeless 1   3.  Trouble falling or staying asleep, or sleeping too much 3   4.  Feeling tired or having little energy 3   5.  Poor appetite or overeating 2   6.  Feeling bad about yourself 0   7.  Trouble concentrating 2   8.  Moving slowly or restless 2   9.  Suicidal or self-harm thoughts 0   PHQ-9 Total Score 15   Difficulty at work, home, or with people Somewhat difficult     RICARDO-7   [employer], 2002; Used with Permission) 7/10/2019   1. Feeling nervous, anxious, or on edge 1   2. Not being able to stop or control worrying 1   3. Worrying too much about different things 2   4. Trouble relaxing 2   5. Being so restless that it is hard to sit still 2   6. Becoming easily annoyed or irritable 2   7. Feeling afraid, as if something awful might happen 0   RICARDO-7 Total Score 10   If you checked any problems, how difficult have they made it for you to do your work, take care of things at home, or get along with other people? Somewhat difficult         Current Outpatient Medications:      acetaminophen (TYLENOL) 500 MG tablet, Take 1,000 mg by mouth every 6 hours as needed for mild pain, Disp: , Rfl:     Patient Active Problem List   Diagnosis     Generalized anxiety disorder     Hyperhidrosis of axilla     Tobacco abuse     Calcium oxalate dihydrate kidney stones     CARDIOVASCULAR SCREENING; LDL GOAL LESS THAN 160     Pain in shoulder     Tension headache     GERD (gastroesophageal reflux disease)     Polyp of colon     Major depressive disorder, recurrent episode, moderate (H)     Prediabetes     Nonintractable episodic headache, unspecified headache type       Blood pressure 118/62, pulse 92, temperature 97.2  F (36.2  C), temperature source Tympanic, resp. rate 16, height 1.803 m (5' 11\"), weight 99.8 kg (220 lb), SpO2 99 %.    Exam:  GENERAL APPEARANCE: healthy, alert and " no distress  EYES: EOMI,  PERRL  PSYCH: mentation appears normal and affect normal/bright      (F33.1) Major depressive disorder, recurrent episode, moderate (H)  (primary encounter diagnosis)  Comment:   Plan: PARoxetine (PAXIL) 10 MG tablet        We discussed the issues and the options. There is some stress and possibly some seasonal component. Use the non drug therapies.   Paxil at 10 mg daily is ordered and start that now. This will take several days to start working and several weeks for max effect.   Call if any side effects. If doing well then recheck in clinic.     (G47.00) Insomnia, unspecified type  Comment:   Plan: traZODone (DESYREL) 50 MG tablet        If the Paxil is not helping the sleep quickly then fill the Rx for Trazodone at 50 mg daily about one hour before the desired sleep time.     Mando Curtis MD

## 2019-10-10 NOTE — PATIENT INSTRUCTIONS
Thank you for choosing [facility].  You may be receiving an email and/or telephone survey request from [facility] [employer] regarding your visit today.  Please take a few minutes to respond to the survey to let us know how we are doing.      If you have questions or concerns, please contact us via [employer] or you can contact your care team at [phone].    Our Clinic hours are:  Monday 6:40 am  to 7:00 pm  Tuesday -Friday 6:40 am to 5:00 pm    The [place] outpatient lab hours are:  Monday - Friday 6:10 am to 4:45 pm  Saturdays 7:00 am to 11:00 am  Appointments are required, call [phone]    If you have clinical questions after hours or would like to schedule an appointment,  call the clinic at [phone].    (F33.1) Major depressive disorder, recurrent episode, moderate (H)  (primary encounter diagnosis)  Comment:   Plan: PARoxetine (PAXIL) 10 MG tablet        We discussed the issues and the options. There is some stress and possibly some seasonal component. Use the non drug therapies.   Paxil at 10 mg daily is ordered and start that now. This will take several days to start working and several weeks for max effect.   Call if any side effects. If doing well then recheck in clinic.     (G47.00) Insomnia, unspecified type  Comment:   Plan: traZODone (DESYREL) 50 MG tablet        If the Paxil is not helping the sleep quickly then fill the Rx for Trazodone at 50 mg daily about one hour before the desired sleep time.

## 2019-10-11 ASSESSMENT — ANXIETY QUESTIONNAIRES: GAD7 TOTAL SCORE: 10

## 2019-11-05 ENCOUNTER — HEALTH MAINTENANCE LETTER (OUTPATIENT)
Age: 39
End: 2019-11-05

## 2019-11-08 ENCOUNTER — OFFICE VISIT (OUTPATIENT)
Dept: FAMILY MEDICINE | Facility: CLINIC | Age: 39
End: 2019-11-08
Payer: COMMERCIAL

## 2019-11-08 VITALS
HEART RATE: 95 BPM | SYSTOLIC BLOOD PRESSURE: 136 MMHG | RESPIRATION RATE: 16 BRPM | HEIGHT: 71 IN | TEMPERATURE: 97.7 F | OXYGEN SATURATION: 98 % | BODY MASS INDEX: 29.96 KG/M2 | DIASTOLIC BLOOD PRESSURE: 72 MMHG | WEIGHT: 214 LBS

## 2019-11-08 DIAGNOSIS — F33.1 MAJOR DEPRESSIVE DISORDER, RECURRENT EPISODE, MODERATE (H): ICD-10-CM

## 2019-11-08 PROCEDURE — 99213 OFFICE O/P EST LOW 20 MIN: CPT | Performed by: FAMILY MEDICINE

## 2019-11-08 RX ORDER — PAROXETINE 10 MG/1
10 TABLET, FILM COATED ORAL EVERY MORNING
Qty: 30 TABLET | Refills: 3 | Status: SHIPPED | OUTPATIENT
Start: 2019-11-08 | End: 2023-06-26

## 2019-11-08 ASSESSMENT — ANXIETY QUESTIONNAIRES
3. WORRYING TOO MUCH ABOUT DIFFERENT THINGS: SEVERAL DAYS
GAD7 TOTAL SCORE: 4
5. BEING SO RESTLESS THAT IT IS HARD TO SIT STILL: NOT AT ALL
2. NOT BEING ABLE TO STOP OR CONTROL WORRYING: SEVERAL DAYS
6. BECOMING EASILY ANNOYED OR IRRITABLE: SEVERAL DAYS
7. FEELING AFRAID AS IF SOMETHING AWFUL MIGHT HAPPEN: NOT AT ALL
1. FEELING NERVOUS, ANXIOUS, OR ON EDGE: SEVERAL DAYS

## 2019-11-08 ASSESSMENT — MIFFLIN-ST. JEOR: SCORE: 1917.83

## 2019-11-08 ASSESSMENT — PATIENT HEALTH QUESTIONNAIRE - PHQ9
SUM OF ALL RESPONSES TO PHQ QUESTIONS 1-9: 6
5. POOR APPETITE OR OVEREATING: NOT AT ALL

## 2019-11-08 NOTE — PATIENT INSTRUCTIONS
(F33.1) Major depressive disorder, recurrent episode, moderate (H)  Comment:   Plan: PARoxetine (PAXIL) 10 MG tablet        You are doing very well and stay on the med at 10 mg daily. Use the non drug therapies.   Avoid stimulants. Exercise is recommended. The minimum time for the med is six months.   Refills are done today for 4 months and recheck then if doing well. We will discuss possibly getting off.

## 2019-11-08 NOTE — PROGRESS NOTES
"Subjective     Zhao De Leon is a 39 year old male who presents to clinic today for the following health issues:    HPI   Medication Followup of Depression    Taking Medication as prescribed: For the Paxil he is taking 10 mg once daily.    Side Effects:  States he feels like a \"Zombie\"    Medication Helping Symptoms:  Yes, he feels he is not as irritable.  Feeling level headed.  That is why he is still at the one tablet daily.  He does have a lot going on right now.    Today the PHQ is 6 and the RICARDO is 4.     PHQ-9 ([employer]) 10/10/2019   1.  Little interest or pleasure in doing things 2   2.  Feeling down, depressed, or hopeless 2   3.  Trouble falling or staying asleep, or sleeping too much 2   4.  Feeling tired or having little energy 2   5.  Poor appetite or overeating 1   6.  Feeling bad about yourself 1   7.  Trouble concentrating 1   8.  Moving slowly or restless 0   9.  Suicidal or self-harm thoughts 0   PHQ-9 Total Score 11     RICARDO-7   [employer], 2002; Used with Permission) 10/10/2019   1. Feeling nervous, anxious, or on edge 2   2. Not being able to stop or control worrying 1   3. Worrying too much about different things 2   4. Trouble relaxing 1   5. Being so restless that it is hard to sit still 1   6. Becoming easily annoyed or irritable 2   7. Feeling afraid, as if something awful might happen 1   RICARDO-7 Total Score 10   If you checked any problems, how difficult have they made it for you to do your work, take care of things at home, or get along with other people? Somewhat difficult          Current Outpatient Medications:      acetaminophen (TYLENOL) 500 MG tablet, Take 1,000 mg by mouth every 6 hours as needed for mild pain, Disp: , Rfl:      PARoxetine (PAXIL) 10 MG tablet, Take 2 tablets (20 mg) by mouth every morning, Disp: 30 tablet, Rfl: 1     traZODone (DESYREL) 50 MG tablet, Take 1 tablet (50 mg) by mouth At Bedtime (Patient not taking: Reported on 11/8/2019), Disp: 30 tablet, Rfl: " "5    Patient Active Problem List   Diagnosis     Generalized anxiety disorder     Hyperhidrosis of axilla     Tobacco abuse     Calcium oxalate dihydrate kidney stones     CARDIOVASCULAR SCREENING; LDL GOAL LESS THAN 160     Pain in shoulder     Tension headache     GERD (gastroesophageal reflux disease)     Polyp of colon     Major depressive disorder, recurrent episode, moderate (H)     Prediabetes     Nonintractable episodic headache, unspecified headache type       Blood pressure 136/72, pulse 95, temperature 97.7  F (36.5  C), temperature source Tympanic, resp. rate 16, height 1.803 m (5' 11\"), weight 97.1 kg (214 lb), SpO2 98 %.    'Exam:  GENERAL APPEARANCE: healthy, alert and no distress  EYES: EOMI,  PERRL  PSYCH: mentation appears normal and affect normal/bright    (F33.1) Major depressive disorder, recurrent episode, moderate (H)  Comment:   Plan: PARoxetine (PAXIL) 10 MG tablet        You are doing very well and stay on the med at 10 mg daily. Use the non drug therapies.   Avoid stimulants. Exercise is recommended. The minimum time for the med is six months.   Refills are done today for 4 months and recheck then if doing well. We will discuss possibly getting off.     Mando Curtis MD    "

## 2019-11-09 ASSESSMENT — ANXIETY QUESTIONNAIRES: GAD7 TOTAL SCORE: 4

## 2019-11-23 ENCOUNTER — HOSPITAL ENCOUNTER (EMERGENCY)
Facility: CLINIC | Age: 39
Discharge: HOME | End: 2019-11-23
Attending: EMERGENCY MEDICINE | Admitting: EMERGENCY MEDICINE
Payer: COMMERCIAL

## 2019-11-23 VITALS
WEIGHT: 220 LBS | DIASTOLIC BLOOD PRESSURE: 82 MMHG | BODY MASS INDEX: 30.68 KG/M2 | TEMPERATURE: 97.6 F | OXYGEN SATURATION: 96 % | RESPIRATION RATE: 16 BRPM | HEART RATE: 105 BPM | SYSTOLIC BLOOD PRESSURE: 120 MMHG

## 2019-11-23 DIAGNOSIS — J06.9 UPPER RESPIRATORY TRACT INFECTION, UNSPECIFIED TYPE: ICD-10-CM

## 2019-11-23 DIAGNOSIS — B97.89 SORE THROAT (VIRAL): ICD-10-CM

## 2019-11-23 DIAGNOSIS — J02.8 SORE THROAT (VIRAL): ICD-10-CM

## 2019-11-23 LAB
DEPRECATED S PYO AG THROAT QL EIA: NORMAL
SPECIMEN SOURCE: NORMAL

## 2019-11-23 PROCEDURE — 87081 CULTURE SCREEN ONLY: CPT | Performed by: EMERGENCY MEDICINE

## 2019-11-23 PROCEDURE — 99284 EMERGENCY DEPT VISIT MOD MDM: CPT | Mod: Z6 | Performed by: EMERGENCY MEDICINE

## 2019-11-23 PROCEDURE — 87880 STREP A ASSAY W/OPTIC: CPT | Performed by: EMERGENCY MEDICINE

## 2019-11-23 PROCEDURE — 25000125 ZZHC RX 250: Performed by: EMERGENCY MEDICINE

## 2019-11-23 PROCEDURE — 99284 EMERGENCY DEPT VISIT MOD MDM: CPT

## 2019-11-23 RX ORDER — DEXAMETHASONE SODIUM PHOSPHATE 4 MG/ML
10 VIAL (ML) INJECTION ONCE
Status: COMPLETED | OUTPATIENT
Start: 2019-11-23 | End: 2019-11-23

## 2019-11-23 RX ADMIN — DEXAMETHASONE SODIUM PHOSPHATE 10 MG: 4 INJECTION, SOLUTION INTRAMUSCULAR; INTRAVENOUS at 07:19

## 2019-11-23 NOTE — DISCHARGE INSTRUCTIONS
Return if symptoms worsen or new symptoms develop.  Follow-up with primary care physician next week for recheck.  Drink plenty fluids.  Gargle with salt water and take throat lozenges.  Take over-the-counter decongestants and ibuprofen and Tylenol for pain.  A culture will be sent off and if there is positive we will call you.  You are given a steroid which is long-acting and should improve the inflammation in your throat and difficulty swallowing.  If increased pain difficulty swallowing fevers not controlled with ibuprofen or Tylenol chest pain shortness of breath or other symptoms present please return for recheck.

## 2019-11-23 NOTE — ED NOTES
Pt reports sore throat since wed-thurs this week worsening. Denies ear pain, cold symptoms, sob, headache.

## 2019-11-23 NOTE — ED PROVIDER NOTES
History     Chief Complaint   Patient presents with     Pharyngitis     HPI  Zhao De Leon is a 39 year old male with a history of depression GERD headaches who presents emergency department complaining of a sore throat.  Patient states she has had upper respiratory symptoms for the past 2 or 3 days.  He has been mildly congested with some ear fullness.  He has had a mild nonproductive cough.  Since yesterday his throat has been sore and is just worsened to when he woke up this morning he had significant pain with swallowing.  He denies any fevers or chills he has not had any significant headache.  He denies any neck pain.  He has not had any chest pain or shortness of breath.  He denies any rash.  He has not had any abdominal pain or nausea or vomiting.    Allergies:  Allergies   Allergen Reactions     Demerol Hives       Problem List:    Patient Active Problem List    Diagnosis Date Noted     Nonintractable episodic headache, unspecified headache type 07/10/2019     Priority: Medium     Prediabetes 11/07/2016     Priority: Medium     Major depressive disorder, recurrent episode, moderate (H) 01/06/2016     Priority: Medium     Wellbutrin caused headaches.        Polyp of colon 04/10/2013     Priority: Medium     GERD (gastroesophageal reflux disease) 04/04/2012     Priority: Medium     Pain in shoulder 03/22/2011     Priority: Medium     Tension headache 03/22/2011     Priority: Medium     CARDIOVASCULAR SCREENING; LDL GOAL LESS THAN 160 10/31/2010     Priority: Medium     Calcium oxalate dihydrate kidney stones 02/08/2010     Priority: Medium     Generalized anxiety disorder 09/22/2009     Priority: Medium     Diagnosis updated by automated process. Provider to review and confirm.       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     Priority: Medium     Tobacco abuse 09/22/2009     Priority: Medium        Past Medical History:    Past Medical History:   Diagnosis Date     Lumbago      Lumbar disc disease       Peptic ulcer, unspecified site, unspecified as acute or chronic, without mention of hemorrhage, perforation, or obstruction      Ulcer 2000       Past Surgical History:    Past Surgical History:   Procedure Laterality Date     COLONOSCOPY  3/6/2013    Procedure: COLONOSCOPY;  Colonoscopy  ;  Surgeon: Aashish Sierra MD;  Location: [facility]     SURGICAL HISTORY OF -       Lumbar Disc Herniation       Family History:    Family History   Problem Relation Age of Onset     Gastrointestinal Disease Father         hepatitis B     Blood Disease Father         hepatitis B     Cancer - colorectal Maternal Grandmother      Cancer - colorectal Paternal Grandmother      Coronary Artery Disease Maternal Grandfather 72        stroke     Aneurysm No family hx of      Brain Hemorrhage No family hx of      Brain Tumor No family hx of      Seizure Disorder No family hx of      Cerebrovascular Disease No family hx of      Migraines No family hx of        Social History:  Marital Status:   [2]  Social History     Tobacco Use     Smoking status: Current Every Day Smoker     Packs/day: 0.50     Types: Other     Smokeless tobacco: Never Used     Tobacco comment:  1/2 to 1 ppd- he is using the vapor.    Substance Use Topics     Alcohol use: Yes     Comment: occ- very rare, once per year.     Drug use: No        Medications:    acetaminophen (TYLENOL) 500 MG tablet  PARoxetine (PAXIL) 10 MG tablet  traZODone (DESYREL) 50 MG tablet          Review of Systems  As per HPI.    Physical Exam   BP: 120/82  Pulse: 105  Temp: 97.6  F (36.4  C)  Resp: 16  Weight: 99.8 kg (220 lb)  SpO2: 96 %      Physical Exam  Nursing note reviewed.   Constitutional:       General: He is not in acute distress.     Appearance: Normal appearance. He is normal weight. He is not ill-appearing, toxic-appearing or diaphoretic.   HENT:      Head: Normocephalic.      Ears:      Comments: TMs clear bilaterally small amount of cerumen noted left ear.     Nose:       Comments: Mild nasal congestion     Mouth/Throat:      Mouth: Mucous membranes are moist.      Pharynx: Oropharynx is clear.      Comments: Mild erythema edema posterior pharynx without exudate.  Eyes:      Conjunctiva/sclera: Conjunctivae normal.   Neck:      Musculoskeletal: Normal range of motion.   Cardiovascular:      Rate and Rhythm: Normal rate and regular rhythm.      Heart sounds: No murmur.   Pulmonary:      Effort: Pulmonary effort is normal. No respiratory distress.      Breath sounds: Normal breath sounds. No wheezing, rhonchi or rales.   Chest:      Chest wall: No tenderness.   Musculoskeletal: Normal range of motion.   Skin:     General: Skin is warm and dry.   Neurological:      Mental Status: He is alert. Mental status is at baseline.      Motor: No weakness.   Psychiatric:         Mood and Affect: Mood normal.         ED Course        Procedures               Critical Care time:  none               No results found for this or any previous visit (from the past 24 hour(s)).    Medications   dexamethasone (DECADRON) oral solution (inj used orally) 10 mg (has no administration in time range)       Assessments & Plan (with Medical Decision Making) records were reviewed.  Strep screen was obtained.  Patient was given Decadron orally.  Strep screen was negative.  Findings were discussed with patient in detail.  This appears to be a viral illness.  His lungs are clear and I do not think an x-ray is warranted at this time.  Patient should continue symptomatic treatment and return if fevers not controlled with ibuprofen and Tylenol, difficulty swallowing.  Shortness of breath nausea vomiting abdominal pain or other symptoms present.  Patient is agree with this plan.     I have reviewed the nursing notes.    I have reviewed the findings, diagnosis, plan and need for follow up with the patient.       Discharge Medication List as of 11/23/2019  7:43 AM          Final diagnoses:   Upper respiratory tract  infection, unspecified type   Sore throat (viral)       11/23/2019   [facility] EMERGENCY DEPARTMENT     Mando Bacon MD  11/26/19 0790

## 2019-11-23 NOTE — ED AVS SNAPSHOT
[facility] Emergency Department  [address] [address]  Phone:  [phone]  Fax:  [phone]                                    Zhao De Leon   MRN: [id number]    Department:  [facility] Emergency Department   Date of Visit:  11/23/2019           After Visit Summary Signature Page    I have received my discharge instructions, and my questions have been answered. I have discussed any challenges I see with this plan with the nurse or doctor.    ..........................................................................................................................................  Patient/Patient Representative Signature      ..........................................................................................................................................  Patient Representative Print Name and Relationship to Patient    ..................................................               ................................................  Date                                   Time    ..........................................................................................................................................  Reviewed by Signature/Title    ...................................................              ..............................................  Date                                               Time          22EPIC Rev 08/18

## 2019-11-25 LAB
BACTERIA SPEC CULT: NORMAL
Lab: NORMAL
SPECIMEN SOURCE: NORMAL

## 2019-11-25 NOTE — RESULT ENCOUNTER NOTE
Final Beta strep group A r/o culture is NEGATIVE for Group A streptococcus.    No treatment or change in treatment per [place] Strep protocol.

## 2020-11-22 ENCOUNTER — HEALTH MAINTENANCE LETTER (OUTPATIENT)
Age: 40
End: 2020-11-22

## 2021-09-19 ENCOUNTER — HEALTH MAINTENANCE LETTER (OUTPATIENT)
Age: 41
End: 2021-09-19

## 2022-01-09 ENCOUNTER — HEALTH MAINTENANCE LETTER (OUTPATIENT)
Age: 42
End: 2022-01-09

## 2022-11-20 ENCOUNTER — HEALTH MAINTENANCE LETTER (OUTPATIENT)
Age: 42
End: 2022-11-20

## 2023-03-16 ENCOUNTER — OFFICE VISIT (OUTPATIENT)
Dept: FAMILY MEDICINE | Facility: CLINIC | Age: 43
End: 2023-03-16
Payer: COMMERCIAL

## 2023-03-16 VITALS
TEMPERATURE: 97.8 F | SYSTOLIC BLOOD PRESSURE: 124 MMHG | WEIGHT: 221.9 LBS | OXYGEN SATURATION: 96 % | DIASTOLIC BLOOD PRESSURE: 78 MMHG | HEIGHT: 71 IN | HEART RATE: 84 BPM | RESPIRATION RATE: 16 BRPM | BODY MASS INDEX: 31.06 KG/M2

## 2023-03-16 DIAGNOSIS — Z11.4 SCREENING FOR HIV (HUMAN IMMUNODEFICIENCY VIRUS): ICD-10-CM

## 2023-03-16 DIAGNOSIS — Z13.220 SCREENING FOR HYPERLIPIDEMIA: ICD-10-CM

## 2023-03-16 DIAGNOSIS — Z30.2 ENCOUNTER FOR STERILIZATION: Primary | ICD-10-CM

## 2023-03-16 DIAGNOSIS — Z11.59 NEED FOR HEPATITIS C SCREENING TEST: ICD-10-CM

## 2023-03-16 PROCEDURE — 99213 OFFICE O/P EST LOW 20 MIN: CPT | Performed by: FAMILY MEDICINE

## 2023-03-16 RX ORDER — DIAZEPAM 10 MG
TABLET ORAL
Qty: 1 TABLET | Refills: 0 | Status: SHIPPED | OUTPATIENT
Start: 2023-03-16 | End: 2023-09-22

## 2023-03-16 RX ORDER — OXYCODONE AND ACETAMINOPHEN 5; 325 MG/1; MG/1
TABLET ORAL
Qty: 12 TABLET | Refills: 0 | Status: SHIPPED | OUTPATIENT
Start: 2023-03-16 | End: 2023-09-22

## 2023-03-16 ASSESSMENT — ANXIETY QUESTIONNAIRES
GAD7 TOTAL SCORE: 0
6. BECOMING EASILY ANNOYED OR IRRITABLE: NOT AT ALL
GAD7 TOTAL SCORE: 0
2. NOT BEING ABLE TO STOP OR CONTROL WORRYING: NOT AT ALL
3. WORRYING TOO MUCH ABOUT DIFFERENT THINGS: NOT AT ALL
5. BEING SO RESTLESS THAT IT IS HARD TO SIT STILL: NOT AT ALL
1. FEELING NERVOUS, ANXIOUS, OR ON EDGE: NOT AT ALL
IF YOU CHECKED OFF ANY PROBLEMS ON THIS QUESTIONNAIRE, HOW DIFFICULT HAVE THESE PROBLEMS MADE IT FOR YOU TO DO YOUR WORK, TAKE CARE OF THINGS AT HOME, OR GET ALONG WITH OTHER PEOPLE: NOT DIFFICULT AT ALL
7. FEELING AFRAID AS IF SOMETHING AWFUL MIGHT HAPPEN: NOT AT ALL

## 2023-03-16 ASSESSMENT — PATIENT HEALTH QUESTIONNAIRE - PHQ9
SUM OF ALL RESPONSES TO PHQ QUESTIONS 1-9: 1
5. POOR APPETITE OR OVEREATING: NOT AT ALL

## 2023-03-16 ASSESSMENT — PAIN SCALES - GENERAL: PAINLEVEL: NO PAIN (0)

## 2023-03-16 NOTE — PROGRESS NOTES
SUBJECTIVE:                                                    Zhao De Leon is a 43 year old male who presents to clinic today for the following health issues:    Chief Complaint   Patient presents with     Consult For     Answers for HPI/ROS submitted by the patient on 3/15/2023  What is the reason for your visit today? : Vasectomy consult  How many servings of fruits and vegetables do you eat daily?: 0-1  On average, how many sweetened beverages do you drink each day (Examples: soda, juice, sweet tea, etc.  Do NOT count diet or artificially sweetened beverages)?: 2  How many minutes a day do you exercise enough to make your heart beat faster?: 10 to 19  How many days a week do you exercise enough to make your heart beat faster?: 3 or less  How many days per week do you miss taking your medication?: 0      Problem list and histories reviewed & adjusted, as indicated.  Additional history:     Patient Active Problem List   Diagnosis     Generalized anxiety disorder     Hyperhidrosis of axilla     Tobacco abuse     Calcium oxalate dihydrate kidney stones     CARDIOVASCULAR SCREENING; LDL GOAL LESS THAN 160     Pain in shoulder     Tension headache     GERD (gastroesophageal reflux disease)     Polyp of colon     Major depressive disorder, recurrent episode, moderate (H)     Prediabetes     Nonintractable episodic headache, unspecified headache type     Past Surgical History:   Procedure Laterality Date     COLONOSCOPY  3/6/2013    Procedure: COLONOSCOPY;  Colonoscopy  ;  Surgeon: Aashish Sierra MD;  Location: [facility]     SURGICAL HISTORY OF -       Lumbar Disc Herniation       Social History     Tobacco Use     Smoking status: Former     Types: Vaping Device, Cigarettes     Smokeless tobacco: Never     Tobacco comments:      1/2 to 1 ppd- he is using the vapor.    Substance Use Topics     Alcohol use: Yes     Comment: occ- very rare, once per year.     Family History   Problem Relation Age of Onset      "Gastrointestinal Disease Father         hepatitis B     Blood Disease Father         hepatitis B     Cancer - colorectal Maternal Grandmother      Cancer - colorectal Paternal Grandmother      Coronary Artery Disease Maternal Grandfather 72        stroke     Aneurysm No family hx of      Brain Hemorrhage No family hx of      Brain Tumor No family hx of      Seizure Disorder No family hx of      Cerebrovascular Disease No family hx of      Migraines No family hx of          Current Outpatient Medications   Medication Sig Dispense Refill     acetaminophen (TYLENOL) 500 MG tablet Take 1,000 mg by mouth every 6 hours as needed for mild pain       diazepam (VALIUM) 10 MG tablet 1 tab 1 hour before procedure 1 tablet 0     oxyCODONE-acetaminophen (PERCOCET) 5-325 MG tablet 1 tab 1 hour before procedure:  than 1 tab q 6hrs as needed post procedure 12 tablet 0     PARoxetine (PAXIL) 10 MG tablet Take 1 tablet (10 mg) by mouth every morning 30 tablet 3     traZODone (DESYREL) 50 MG tablet Take 1 tablet (50 mg) by mouth At Bedtime 30 tablet 5     Allergies   Allergen Reactions     Demerol Hives     Recent Labs   Lab Test 06/29/19  1521 07/24/18  1655 01/13/17  1900 11/07/16  1626   A1C  --  6.3* 6.3* 6.1*   ALT 57 27 21  --    CR 1.05 0.94 0.96  --    GFRESTIMATED >90 >90 90  --    GFRESTBLACK >90 >90 >90  African American GFR Calc    --    POTASSIUM 3.9 3.8 3.7  --    TSH 0.79  --   --   --         ROS:  Constitutional, HEENT, cardiovascular, pulmonary, gi and gu systems are negative, except as otherwise noted.    OBJECTIVE:                                                    /78   Pulse 84   Temp 97.8  F (36.6  C) (Tympanic)   Resp 16   Ht 1.803 m (5' 11\")   Wt 100.7 kg (221 lb 14.4 oz)   SpO2 96%   BMI 30.95 kg/m   Body mass index is 30.95 kg/m .   GENERAL: healthy, alert, well nourished, well hydrated, no distress  HENT: ear canals- normal; TMs- normal; Nose- normal; Mouth- no ulcers, no lesions  NECK: no " tenderness, no adenopathy, no asymmetry, no masses, no stiffness; thyroid- normal to palpation  RESP: lungs clear to auscultation - no rales, no rhonchi, no wheezes  CV: regular rates and rhythm, normal S1 S2, no S3 or S4 and no murmur, no click or rub -  ABDOMEN: soft, no tenderness, no  hepatosplenomegaly, no masses, normal bowel sounds       ASSESSMENT/PLAN:                                                      (Z30.2) Encounter for sterilization  (primary encounter diagnosis)  Okay for vasc  Plan: oxyCODONE-acetaminophen (PERCOCET) 5-325 MG         tablet, diazepam (VALIUM) 10 MG tablet    (Z13.220) Screening for hyperlipidemia  Plan: Lipid panel reflex to direct LDL Non-fasting      reports that he has quit smoking. His smoking use included vaping device and cigarettes. He has never used smokeless tobacco.      Weight management plan: Discussed healthy diet and exercise guidelines      [facility]

## 2023-03-30 ENCOUNTER — OFFICE VISIT (OUTPATIENT)
Dept: FAMILY MEDICINE | Facility: CLINIC | Age: 43
End: 2023-03-30
Payer: COMMERCIAL

## 2023-03-30 VITALS
HEIGHT: 71 IN | OXYGEN SATURATION: 96 % | WEIGHT: 220.3 LBS | RESPIRATION RATE: 16 BRPM | SYSTOLIC BLOOD PRESSURE: 128 MMHG | TEMPERATURE: 96.9 F | DIASTOLIC BLOOD PRESSURE: 82 MMHG | HEART RATE: 81 BPM | BODY MASS INDEX: 30.84 KG/M2

## 2023-03-30 DIAGNOSIS — Z30.2 ENCOUNTER FOR STERILIZATION: Primary | ICD-10-CM

## 2023-03-30 PROCEDURE — 55250 REMOVAL OF SPERM DUCT(S): CPT | Mod: 53 | Performed by: FAMILY MEDICINE

## 2023-03-30 RX ORDER — DIAZEPAM 10 MG
10 TABLET ORAL EVERY 6 HOURS PRN
Qty: 1 TABLET | Refills: 0 | Status: SHIPPED | OUTPATIENT
Start: 2023-03-30 | End: 2023-09-22

## 2023-03-30 RX ORDER — DIAZEPAM 10 MG
10 TABLET ORAL EVERY 6 HOURS PRN
Qty: 1 TABLET | Refills: 0 | Status: SHIPPED | OUTPATIENT
Start: 2023-03-30 | End: 2023-03-30

## 2023-03-30 ASSESSMENT — PAIN SCALES - GENERAL: PAINLEVEL: NO PAIN (0)

## 2023-03-30 NOTE — PROGRESS NOTES
Patient arrived for vasectomy.  He was very nervous despite taking 10 mg valium and 5 mg percocet 1 hour prior.  He was given an additional 10 mg valium.      After waiting 30 mns attempted local anesthesia with 1% lidocaine.   Patient tolerated injection poorly.  After 5 mns pinch test revealed significant tenderness.  Attempt again at anesthesia 2% lidocain was less sensitive put patient again failed pinch test.    Local anesthesia again attempted he tolerated the injection well but still failed finger pressure on vas.  It was then determined procededure should be cancled and rescheduled tohave procedure under general anethesia.    pateint was obd served post atempt.  He was ambulating talking with pout any difficuty  He was discharged under girlfriends supervision.  She was instructed to not leave patient alone for 5 hours.    Jigar

## 2023-04-15 ENCOUNTER — HEALTH MAINTENANCE LETTER (OUTPATIENT)
Age: 43
End: 2023-04-15

## 2023-05-31 ENCOUNTER — TELEPHONE (OUTPATIENT)
Dept: NEPHROLOGY | Facility: CLINIC | Age: 43
End: 2023-05-31
Payer: COMMERCIAL

## 2023-05-31 NOTE — TELEPHONE ENCOUNTER
[facility] Call Center    Phone Message    May a detailed message be left on voicemail: yes     Reason for Call: Patient needs vasectomy procedure but needs to be put out for procedure. Per referring provider, has panic disorder recomend gen anesthesia. Patient stated that he needs to be put out for this. Sending encounter message for review and follow-up with patient for scheduling.     Action Taken: Message routed to:  [facility] ([facility] Uro    Travel Screening: Not Applicable

## 2023-06-04 ENCOUNTER — APPOINTMENT (OUTPATIENT)
Dept: CT IMAGING | Facility: CLINIC | Age: 43
End: 2023-06-04
Attending: EMERGENCY MEDICINE
Payer: COMMERCIAL

## 2023-06-04 ENCOUNTER — HOSPITAL ENCOUNTER (EMERGENCY)
Facility: CLINIC | Age: 43
Discharge: HOME | End: 2023-06-04
Attending: EMERGENCY MEDICINE | Admitting: EMERGENCY MEDICINE
Payer: COMMERCIAL

## 2023-06-04 VITALS
HEART RATE: 88 BPM | SYSTOLIC BLOOD PRESSURE: 122 MMHG | TEMPERATURE: 97.9 F | RESPIRATION RATE: 18 BRPM | HEIGHT: 70 IN | WEIGHT: 223 LBS | DIASTOLIC BLOOD PRESSURE: 79 MMHG | OXYGEN SATURATION: 96 % | BODY MASS INDEX: 31.92 KG/M2

## 2023-06-04 DIAGNOSIS — N23 RENAL COLIC: ICD-10-CM

## 2023-06-04 LAB
ALBUMIN UR-MCNC: 30 MG/DL
APPEARANCE UR: ABNORMAL
BASOPHILS # BLD AUTO: 0.1 10E3/UL (ref 0–0.2)
BASOPHILS NFR BLD AUTO: 1 %
BILIRUB UR QL STRIP: NEGATIVE
COLOR UR AUTO: YELLOW
EOSINOPHIL # BLD AUTO: 0.2 10E3/UL (ref 0–0.7)
EOSINOPHIL NFR BLD AUTO: 3 %
ERYTHROCYTE [DISTWIDTH] IN BLOOD BY AUTOMATED COUNT: 13.2 % (ref 10–15)
GLUCOSE UR STRIP-MCNC: NEGATIVE MG/DL
HCT VFR BLD AUTO: 47.1 % (ref 40–53)
HGB BLD-MCNC: 15.5 G/DL (ref 13.3–17.7)
HGB UR QL STRIP: ABNORMAL
HOLD SPECIMEN: NORMAL
IMM GRANULOCYTES # BLD: 0.1 10E3/UL
IMM GRANULOCYTES NFR BLD: 1 %
KETONES UR STRIP-MCNC: NEGATIVE MG/DL
LEUKOCYTE ESTERASE UR QL STRIP: NEGATIVE
LYMPHOCYTES # BLD AUTO: 2 10E3/UL (ref 0.8–5.3)
LYMPHOCYTES NFR BLD AUTO: 24 %
MCH RBC QN AUTO: 28.8 PG (ref 26.5–33)
MCHC RBC AUTO-ENTMCNC: 32.9 G/DL (ref 31.5–36.5)
MCV RBC AUTO: 87 FL (ref 78–100)
MONOCYTES # BLD AUTO: 0.6 10E3/UL (ref 0–1.3)
MONOCYTES NFR BLD AUTO: 8 %
MUCOUS THREADS #/AREA URNS LPF: PRESENT /LPF
NEUTROPHILS # BLD AUTO: 5.4 10E3/UL (ref 1.6–8.3)
NEUTROPHILS NFR BLD AUTO: 63 %
NITRATE UR QL: NEGATIVE
NRBC # BLD AUTO: 0 10E3/UL
NRBC BLD AUTO-RTO: 0 /100
PH UR STRIP: 6 [PH] (ref 5–7)
PLATELET # BLD AUTO: 238 10E3/UL (ref 150–450)
RBC # BLD AUTO: 5.39 10E6/UL (ref 4.4–5.9)
RBC URINE: >182 /HPF
SP GR UR STRIP: 1.01 (ref 1–1.03)
SQUAMOUS EPITHELIAL: <1 /HPF
UROBILINOGEN UR STRIP-MCNC: NORMAL MG/DL
WBC # BLD AUTO: 8.3 10E3/UL (ref 4–11)
WBC URINE: 13 /HPF

## 2023-06-04 PROCEDURE — 74176 CT ABD & PELVIS W/O CONTRAST: CPT

## 2023-06-04 PROCEDURE — 85025 COMPLETE CBC W/AUTO DIFF WBC: CPT | Performed by: EMERGENCY MEDICINE

## 2023-06-04 PROCEDURE — 36415 COLL VENOUS BLD VENIPUNCTURE: CPT | Performed by: EMERGENCY MEDICINE

## 2023-06-04 PROCEDURE — 99284 EMERGENCY DEPT VISIT MOD MDM: CPT | Mod: 25 | Performed by: EMERGENCY MEDICINE

## 2023-06-04 PROCEDURE — 96360 HYDRATION IV INFUSION INIT: CPT | Performed by: EMERGENCY MEDICINE

## 2023-06-04 PROCEDURE — 81001 URINALYSIS AUTO W/SCOPE: CPT | Performed by: EMERGENCY MEDICINE

## 2023-06-04 PROCEDURE — 96361 HYDRATE IV INFUSION ADD-ON: CPT | Performed by: EMERGENCY MEDICINE

## 2023-06-04 PROCEDURE — 87086 URINE CULTURE/COLONY COUNT: CPT | Performed by: EMERGENCY MEDICINE

## 2023-06-04 PROCEDURE — 258N000003 HC RX IP 258 OP 636: Performed by: EMERGENCY MEDICINE

## 2023-06-04 PROCEDURE — 99284 EMERGENCY DEPT VISIT MOD MDM: CPT | Performed by: EMERGENCY MEDICINE

## 2023-06-04 RX ORDER — HYDROCODONE BITARTRATE AND ACETAMINOPHEN 5; 325 MG/1; MG/1
1 TABLET ORAL EVERY 6 HOURS PRN
Qty: 10 TABLET | Refills: 0 | Status: SHIPPED | OUTPATIENT
Start: 2023-06-04 | End: 2023-06-07

## 2023-06-04 RX ADMIN — SODIUM CHLORIDE 1000 ML: 9 INJECTION, SOLUTION INTRAVENOUS at 06:42

## 2023-06-04 ASSESSMENT — ENCOUNTER SYMPTOMS
WEAKNESS: 0
CONFUSION: 0
VOMITING: 1
CHILLS: 0
DIFFICULTY URINATING: 1
SHORTNESS OF BREATH: 0
FREQUENCY: 0
NECK STIFFNESS: 0
DYSURIA: 0
PALPITATIONS: 0
LIGHT-HEADEDNESS: 0
COUGH: 0
CHEST TIGHTNESS: 0
MYALGIAS: 0
EYE REDNESS: 0
ABDOMINAL PAIN: 1
FEVER: 0
NUMBNESS: 0
NAUSEA: 1
BACK PAIN: 0
NERVOUS/ANXIOUS: 0
BLOOD IN STOOL: 0
SORE THROAT: 0
AGITATION: 0
FLANK PAIN: 1
HEADACHES: 0
DIARRHEA: 0
SEIZURES: 0
EYE DISCHARGE: 0

## 2023-06-04 ASSESSMENT — ACTIVITIES OF DAILY LIVING (ADL): ADLS_ACUITY_SCORE: 35

## 2023-06-04 NOTE — ED TRIAGE NOTES
"Patient woke up with sharp pain in right flank at around 0400. Pt states the pain went away quickly but started hurting towards penis. Pt tried to urinate and was only able to get out a small amount. Pt states that he \"feels full\" at tip of the penis.      "

## 2023-06-04 NOTE — ED PROVIDER NOTES
History     Chief Complaint   Patient presents with     Flank Pain     Hematuria     Dysuria     HPI  Zhao De Leon is a 43 year old male who has a history of renal colic multiple years ago who presents with acute onset of right-sided flank pain and difficulty with urination since about 4:00 this morning.  His pain has improved.  He noticed a little bit of pinkness to his urine.  He has no fever.  He had nausea with one episode of vomiting.  He has no melena or bright red blood per rectum.  He denies diarrhea or constipation.  He denies trauma.    Allergies:  Allergies   Allergen Reactions     Demerol Hives       Problem List:    Patient Active Problem List    Diagnosis Date Noted     Nonintractable episodic headache, unspecified headache type 07/10/2019     Priority: Medium     Prediabetes 11/07/2016     Priority: Medium     Major depressive disorder, recurrent episode, moderate (H) 01/06/2016     Priority: Medium     Wellbutrin caused headaches.        Polyp of colon 04/10/2013     Priority: Medium     GERD (gastroesophageal reflux disease) 04/04/2012     Priority: Medium     Pain in shoulder 03/22/2011     Priority: Medium     Tension headache 03/22/2011     Priority: Medium     CARDIOVASCULAR SCREENING; LDL GOAL LESS THAN 160 10/31/2010     Priority: Medium     Calcium oxalate dihydrate kidney stones 02/08/2010     Priority: Medium     Generalized anxiety disorder 09/22/2009     Priority: Medium     Diagnosis updated by automated process. Provider to review and confirm.       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     Priority: Medium     Tobacco abuse 09/22/2009     Priority: Medium        Past Medical History:    Past Medical History:   Diagnosis Date     Lumbago      Lumbar disc disease      Peptic ulcer, unspecified site, unspecified as acute or chronic, without mention of hemorrhage, perforation, or obstruction      Ulcer 2000       Past Surgical History:    Past Surgical History:   Procedure  Laterality Date     COLONOSCOPY  3/6/2013    Procedure: COLONOSCOPY;  Colonoscopy  ;  Surgeon: Aashish Sierra MD;  Location: [facility]     SURGICAL HISTORY OF -       Lumbar Disc Herniation       Family History:    Family History   Problem Relation Age of Onset     Gastrointestinal Disease Father         hepatitis B     Blood Disease Father         hepatitis B     Cancer - colorectal Maternal Grandmother      Cancer - colorectal Paternal Grandmother      Coronary Artery Disease Maternal Grandfather 72        stroke     Aneurysm No family hx of      Brain Hemorrhage No family hx of      Brain Tumor No family hx of      Seizure Disorder No family hx of      Cerebrovascular Disease No family hx of      Migraines No family hx of        Social History:  Marital Status:   [4]  Social History     Tobacco Use     Smoking status: Former     Types: Vaping Device, Cigarettes     Smokeless tobacco: Never     Tobacco comments:      1/2 to 1 ppd- he is using the vapor.    Vaping Use     Vaping status: Every Day   Substance Use Topics     Alcohol use: Yes     Comment: occ- very rare, once per year.     Drug use: No        Medications:    HYDROcodone-acetaminophen (NORCO) 5-325 MG tablet  acetaminophen (TYLENOL) 500 MG tablet  diazepam (VALIUM) 10 MG tablet  diazepam (VALIUM) 10 MG tablet  oxyCODONE-acetaminophen (PERCOCET) 5-325 MG tablet  PARoxetine (PAXIL) 10 MG tablet  traZODone (DESYREL) 50 MG tablet          Review of Systems   Constitutional: Negative for chills and fever.   HENT: Negative for sore throat.    Eyes: Negative for discharge and redness.   Respiratory: Negative for cough, chest tightness and shortness of breath.    Cardiovascular: Negative for chest pain, palpitations and leg swelling.   Gastrointestinal: Positive for abdominal pain, nausea and vomiting. Negative for blood in stool and diarrhea.   Genitourinary: Positive for difficulty urinating, flank pain, testicular pain and urgency. Negative for  "dysuria and frequency.   Musculoskeletal: Negative for back pain, myalgias and neck stiffness.   Skin: Negative for pallor.   Neurological: Negative for seizures, weakness, light-headedness, numbness and headaches.   Psychiatric/Behavioral: Negative for agitation and confusion. The patient is not nervous/anxious.        Physical Exam   BP: (!) 141/92  Pulse: 97  Temp: 97.9  F (36.6  C)  Resp: 18  Height: 177.8 cm (5' 10\")  Weight: 101.2 kg (223 lb)  SpO2: 97 %      Physical Exam  Vitals and nursing note reviewed.   Constitutional:       General: He is not in acute distress.     Appearance: He is well-developed. He is not diaphoretic.   HENT:      Head: Normocephalic and atraumatic.   Eyes:      General: No scleral icterus.     Pupils: Pupils are equal, round, and reactive to light.   Cardiovascular:      Rate and Rhythm: Normal rate and regular rhythm.      Heart sounds: Normal heart sounds. No murmur heard.  Pulmonary:      Effort: No respiratory distress.      Breath sounds: No stridor. No wheezing or rales.   Abdominal:      General: Bowel sounds are normal.      Palpations: Abdomen is soft.      Tenderness: There is no abdominal tenderness.   Musculoskeletal:         General: No tenderness.      Cervical back: Normal range of motion and neck supple.   Skin:     General: Skin is warm and dry.      Coloration: Skin is not pale.      Findings: No erythema or rash.   Neurological:      Mental Status: He is alert and oriented to person, place, and time.      Sensory: No sensory deficit.      Coordination: Coordination normal.         ED Course             Procedures                Results for orders placed or performed during the hospital encounter of 06/04/23 (from the past 48 hour(s))   CBC with platelets, differential    Narrative    The following orders were created for panel order CBC with platelets, differential.  Procedure                               Abnormality         Status                     ---------   "                             -----------         ------                     CBC with platelets and d...[[id number]]                      Final result                 Please view results for these tests on the individual orders.   [place] Draw    Narrative    The following orders were created for panel order [place] Draw.  Procedure                               Abnormality         Status                     ---------                               -----------         ------                     Extra Blue Top Tube[[id number]]                              Final result               Extra Red Top Tube[[id number]]                               Final result               Extra Green Top (Lithium...[[id number]]                      Final result                 Please view results for these tests on the individual orders.   CBC with platelets and differential   Result Value Ref Range    WBC Count 8.3 4.0 - 11.0 10e3/uL    RBC Count 5.39 4.40 - 5.90 10e6/uL    Hemoglobin 15.5 13.3 - 17.7 g/dL    Hematocrit 47.1 40.0 - 53.0 %    MCV 87 78 - 100 fL    MCH 28.8 26.5 - 33.0 pg    MCHC 32.9 31.5 - 36.5 g/dL    RDW 13.2 10.0 - 15.0 %    Platelet Count 238 150 - 450 10e3/uL    % Neutrophils 63 %    % Lymphocytes 24 %    % Monocytes 8 %    % Eosinophils 3 %    % Basophils 1 %    % Immature Granulocytes 1 %    NRBCs per 100 WBC 0 <1 /100    Absolute Neutrophils 5.4 1.6 - 8.3 10e3/uL    Absolute Lymphocytes 2.0 0.8 - 5.3 10e3/uL    Absolute Monocytes 0.6 0.0 - 1.3 10e3/uL    Absolute Eosinophils 0.2 0.0 - 0.7 10e3/uL    Absolute Basophils 0.1 0.0 - 0.2 10e3/uL    Absolute Immature Granulocytes 0.1 <=0.4 10e3/uL    Absolute NRBCs 0.0 10e3/uL   Extra Blue Top Tube   Result Value Ref Range    Hold Specimen JIC    Extra Red Top Tube   Result Value Ref Range    Hold Specimen JIC    Extra Green Top (Lithium Heparin) Tube   Result Value Ref Range    Hold Specimen JIC    CT Abdomen Pelvis w/o Contrast    Narrative    EXAM: CT ABDOMEN PELVIS W/O  CONTRAST  LOCATION: [facility]  DATE/TIME: 6/4/2023 6:19 AM CDT    INDICATION: right flank pain  COMPARISON: 02/01/2010  TECHNIQUE: CT scan of the abdomen and pelvis was performed without IV contrast. Multiplanar reformats were obtained. Dose reduction techniques were used.  CONTRAST: None.    FINDINGS:   LOWER CHEST: Normal.    HEPATOBILIARY: Diffusely decreased attenuation of the liver consistent with steatosis. Normal gallbladder.    PANCREAS: Normal.    SPLEEN: Normal.    ADRENAL GLANDS: Normal.    KIDNEYS/BLADDER: No hydronephrosis on either side. There is a 2 mm calcification layering dependently within the urinary bladder.    BOWEL: Normal appendix. A few colonic diverticula.    LYMPH NODES: Normal.    VASCULATURE: Unremarkable.    PELVIC ORGANS: Normal.    MUSCULOSKELETAL: Normal.      Impression    IMPRESSION:   1.  There is a 2 mm calcification in the urinary bladder that could reflect a recently passed stone, though there is currently no hydronephrosis or ureteral stone on either side.    2.  Hepatic steatosis.     UA with Microscopic reflex to Culture    Specimen: Urine, Clean Catch   Result Value Ref Range    Color Urine Yellow Colorless, Straw, Light Yellow, Yellow    Appearance Urine Slightly Cloudy (A) Clear    Glucose Urine Negative Negative mg/dL    Bilirubin Urine Negative Negative    Ketones Urine Negative Negative mg/dL    Specific Gravity Urine 1.013 1.003 - 1.035    Blood Urine Large (A) Negative    pH Urine 6.0 5.0 - 7.0    Protein Albumin Urine 30 (A) Negative mg/dL    Urobilinogen Urine Normal Normal, 2.0 mg/dL    Nitrite Urine Negative Negative    Leukocyte Esterase Urine Negative Negative    Mucus Urine Present (A) None Seen /LPF    RBC Urine >182 (H) <=2 /HPF    WBC Urine 13 (H) <=5 /HPF    Squamous Epithelials Urine <1 <=1 /HPF    Narrative    Urine Culture ordered based on laboratory criteria       Medications   0.9% sodium chloride BOLUS (0 mLs Intravenous  Stopped 6/4/23 0832)       Assessments & Plan (with Medical Decision Making)     I have reviewed the nursing notes.    I have reviewed the findings, diagnosis, plan and need for follow up with the patient.    Patient is a 43-year-old male with a history of renal colic who presents with right-sided abdominal and flank pain with difficulty urinating since about 4:00 this morning.  He noticed a little bit of pinkish discoloration to his urine.  He has no trauma.  He has no fever or chills.  He had 1 episode of vomiting.  His pain is now improved.    On exam, patient's blood pressure is 141/92 with a pulse rate of 97 and temperature 97.9.  Respirations are 18 with O2 saturations 97%.    CT scan of the abdomen was obtained to evaluate for renal colic or pyelonephritis.    Urinalysis will also be obtained.  Patient will be given IV fluids.    Patient CT scan was performed and independently reviewed by me.  I saw no abnormal calcifications within the proximal renal collection system.  I also saw no evidence of free air or obstruction.  Formal over read revealed a 2 mm calcification in the urinary bladder.    Patient's white count returned at 8.3.  His hemoglobin is 15.5 which is consistent with his most recent hemoglobin 3 years ago which was 16.    Patient's urinalysis returned with a specific gravity 1.013.  There is greater than 182 red cells and 13 white cells per high-powered field.  There is also protein in the urine. I do not believe that the patient has an infection, however, a urine culture will be obtained.    Patient's symptoms seem most likely related to a kidney stone which was passed.  This likely does not represent appendicitis.  He currently has minimal discomfort.  He has no further vomiting.  He will be treated for renal colic and instructed to push fluids and take Tylenol for pain.      Medical Decision Making  The patient's presentation was of moderate complexity (an undiagnosed new problem with  uncertain diagnosis).    The patient's evaluation involved:  ordering and/or review of 3+ test(s) in this encounter (see separate area of note for details)  review of 1 test result(s) ordered prior to this encounter (see separate area of note for details)  independent interpretation of testing performed by another health professional (see separate area of note for details)    The patient's management necessitated moderate risk (prescription drug management including medications given in the ED).        Discharge Medication List as of 6/4/2023  8:32 AM      START taking these medications    Details   HYDROcodone-acetaminophen (NORCO) 5-325 MG tablet Take 1 tablet by mouth every 6 hours as needed for severe pain, Disp-10 tablet, R-0, Local Print             Final diagnoses:   Renal colic       6/4/2023   [facility] EMERGENCY DEPT     [facility]  06/05/23 0855

## 2023-06-05 LAB — BACTERIA UR CULT: NO GROWTH

## 2023-06-05 NOTE — RESULT ENCOUNTER NOTE
"Final urine culture report shows \"NO GROWTH\" and is NEGATIVE.  [facility] Emergency Dept discharge antibiotic: None  Recommendations in treatment per [facility] ED Lab result Urine culture protocol.  "

## 2023-06-26 ENCOUNTER — VIRTUAL VISIT (OUTPATIENT)
Dept: UROLOGY | Facility: CLINIC | Age: 43
End: 2023-06-26
Payer: COMMERCIAL

## 2023-06-26 DIAGNOSIS — Z30.09 VASECTOMY EVALUATION: Primary | ICD-10-CM

## 2023-06-26 PROCEDURE — 99203 OFFICE O/P NEW LOW 30 MIN: CPT | Mod: VID | Performed by: UROLOGY

## 2023-06-26 NOTE — PROGRESS NOTES
Jaquan is a 43 year old who is being evaluated via a billable video visit.      How would you like to obtain your AVS? MyChart  If the video visit is dropped, the invitation should be resent by: Text to cell phone: [phone]  Will anyone else be joining your video visit? No              Subjective   Jaquan is a 43 year old, presenting for the following health issues:  Video Visit    HPI     Patient was requested by Dr. Gonzáles for vasectomy.  He has children.      Review of Systems   Constitutional, HEENT, cardiovascular, pulmonary, gi and gu systems are negative, except as otherwise noted.      Objective           Vitals:  No vitals were obtained today due to virtual visit.    Physical Exam   GENERAL: Healthy, alert and no distress  EYES: Eyes grossly normal to inspection.  No discharge or erythema, or obvious scleral/conjunctival abnormalities.  RESP: No audible wheeze, cough, or visible cyanosis.  No visible retractions or increased work of breathing.    SKIN: Visible skin clear. No significant rash, abnormal pigmentation or lesions.  NEURO: Cranial nerves grossly intact.  Mentation and speech appropriate for age.  PSYCH: Mentation appears normal, affect normal/bright, judgement and insight intact, normal speech and appearance well-groomed.        Discussed    That vasectomy is permanent method of birth control.    That vasectomy can fail due to recanalization of the vas even many months/years later.    That he needs 2 negative sperm checks to be considered sterile    That there are other methods that are not permanent and also that the sperm can be frozen for later use.    How the technique is performed, risks of infection, bleeding, damage to the testes vessels and testes atrophy    Long term complication such as chronic and difficult to treat testes pain and questionable increase incidence of prostate cancer    That the procedure can be done at the clinic or hospital OR        Plan:    Stop Aspirin  Will  schedule Vasectomy in the future          Video-Visit Details    Type of service:  Video Visit     Originating Location (pt. Location): Home    Distant Location (provider location):  On-site  [facility] Visit[facility]

## 2023-06-26 NOTE — PATIENT INSTRUCTIONS
[facility]  [address]  [place] [address]    Please call [phone] to scheduled your procedure, if you need to reschedule this appointment or if you have any questions.     Preparation for Vasectomy:  Eat and drink normally prior to appointment.   Shave the hair away from the base of your penis and around your testicles.  Wear snug underwear the day of the vasectomy to support your testicles.  Do not take aspirin, ibuprofen, advil, motrin, aleve products one week prior to your vasectomy.        General Vasectomy Information    Vasectomy is a surgery.  If it is successful, it will be impossible for you to ever father children.  The following information regards the male sterilization done by an operation called a vasectomy.  This is done in the physician's office.    The operation done to sterilize the male is easier, safer and much less expensive than the operation done to sterilize the woman.    Sterilization should be considered permanent.  For most males, once the operation is done, it can never me undone.  There are attempts made occasionally to reconnect the tubes that have been cut during the procedure, but this is very difficult and expensive and works only about 50-70% of the time.  In order for any of the physicians in our clinic to do a vasectomy, we require that you consider this a permanent form a sterilization.    A vasectomy can be done at any time, but it is best to think about having it done when you can take at least one day off from work and any excess activities.    Your decision to have a vasectomy should only be made with the following facts clear in mind.    1. First, a vasectomy is only one of several means of birth control.  Many form of temporary contraception are available.  If you have any questions about other methods, please discuss this with your physician.    2. A vasectomy may be unsuccessful in approximately one out of 1000 couples per year.  This occurs when the  tubes which are cut during the procedure reconnect themselves.  Sterility cannot be guaranteed.    3. You should be aware that it is the current belief of the medical profession that a vasectomy procedure does not alter a male physically, physiologically or sexually.  Because each person is a unique individual, there is always the possibility of an adverse phychiatric reaction.  This can be best avoided by being very comfortable in your own mind that you want to have this done, and that you do not want to father any children in the future.  If this is not clear in your mind, this should be further discussed with your physician.    4. You will not notice a change in the volume of your ejaculate since sperm is a very small amount of the semen and it is only the sperm that is stopped from entering the ejaculate after a vasectomy.  Your prostate and seminal vesicle glands really supply most of the semen and this is not at all decreased after a vasectomy.  Also there is no effect on the male hormones.    5. You do not become sterile immediately following a vasectomy due to the fact that there is still sperm remaining in your system that must be eliminated by ejaculation.  For this reason, your sexual partner could still become pregnant for a period of time following the vasectomy operation.  It is necessary that contraceptive measures be used until you receive confirmation from your physician that you are sterile.  It takes approximately 12 ejaculations to clear the semen of sperm, but this can differ in different men.  For this reason, it is very important that your semen be checked for sperm before you are considered sterile, and this should be done approximately 12 weeks after your vasectomy.      6. Vasectomy has risks and benefits.  Among the risks are possible complications resulting from the procedure.  These risks include but are not limited to:   A.  Bleeding, infection, or hematoma occuring during or in the  recovery period   from the procedure.   B.  Sperm granuloma or a small pea to walnut sized lump which is a collection of   scar tissue and sperm in your scrotal sack and remains permanently   C.  There may be an increased risk of prostate cancer although the data is   unclear.

## 2023-08-14 ENCOUNTER — TELEPHONE (OUTPATIENT)
Dept: UROLOGY | Facility: CLINIC | Age: 43
End: 2023-08-14
Payer: COMMERCIAL

## 2023-09-22 ENCOUNTER — OFFICE VISIT (OUTPATIENT)
Dept: UROLOGY | Facility: CLINIC | Age: 43
End: 2023-09-22
Payer: COMMERCIAL

## 2023-09-22 VITALS — OXYGEN SATURATION: 100 % | SYSTOLIC BLOOD PRESSURE: 125 MMHG | DIASTOLIC BLOOD PRESSURE: 85 MMHG | HEART RATE: 108 BPM

## 2023-09-22 DIAGNOSIS — Z30.2 ENCOUNTER FOR STERILIZATION: Primary | ICD-10-CM

## 2023-09-22 PROCEDURE — 99000 SPECIMEN HANDLING OFFICE-LAB: CPT | Performed by: UROLOGY

## 2023-09-22 PROCEDURE — 55250 REMOVAL OF SPERM DUCT(S): CPT | Performed by: UROLOGY

## 2023-09-22 PROCEDURE — 88302 TISSUE EXAM BY PATHOLOGIST: CPT | Performed by: PATHOLOGY

## 2023-09-22 NOTE — PROGRESS NOTES
Post Vasectomy education documentation:    To improve patient experience and health outcomes, patient was educated Post vasectomy .    Teaching method:  Patient received written materials handout and general discussion/verbal explanation. Patient was provided with post vasectomy instruction sheet, post vasectomy semen collection sheet and x2 labeled sterile specimen cups.     All questions answered. Patient acknowledged understanding on the education.    Ailyn LOONEY RN Urology 9/22/2023 9:05 AM

## 2023-09-25 LAB
PATH REPORT.COMMENTS IMP SPEC: NORMAL
PATH REPORT.COMMENTS IMP SPEC: NORMAL
PATH REPORT.FINAL DX SPEC: NORMAL
PATH REPORT.GROSS SPEC: NORMAL
PATH REPORT.MICROSCOPIC SPEC OTHER STN: NORMAL
PATH REPORT.RELEVANT HX SPEC: NORMAL
PHOTO IMAGE: NORMAL

## 2023-12-08 ENCOUNTER — MYC MEDICAL ADVICE (OUTPATIENT)
Dept: UROLOGY | Facility: CLINIC | Age: 43
End: 2023-12-08
Payer: COMMERCIAL

## 2023-12-11 NOTE — TELEPHONE ENCOUNTER
Orders faxed to  [phone]. Confirmed that the fax went through.    Ailyn LOONEY RN   [facility], Urology   12/11/2023 2:56 PM

## 2024-06-22 ENCOUNTER — HEALTH MAINTENANCE LETTER (OUTPATIENT)
Age: 44
End: 2024-06-22

## 2024-10-12 ENCOUNTER — MYC MEDICAL ADVICE (OUTPATIENT)
Dept: UROLOGY | Facility: CLINIC | Age: 44
End: 2024-10-12
Payer: COMMERCIAL

## 2024-10-15 ENCOUNTER — LAB (OUTPATIENT)
Dept: LAB | Facility: CLINIC | Age: 44
End: 2024-10-15
Payer: COMMERCIAL

## 2024-10-15 DIAGNOSIS — Z30.2 ENCOUNTER FOR STERILIZATION: ICD-10-CM

## 2024-10-15 LAB — SEMEN ANALYSIS P VAS PNL: NORMAL

## 2024-10-15 PROCEDURE — 89321 SEMEN ANAL SPERM DETECTION: CPT | Mod: QW

## 2025-07-12 ENCOUNTER — HEALTH MAINTENANCE LETTER (OUTPATIENT)
Age: 45
End: 2025-07-12